# Patient Record
Sex: FEMALE | Race: WHITE | NOT HISPANIC OR LATINO | Employment: OTHER | URBAN - METROPOLITAN AREA
[De-identification: names, ages, dates, MRNs, and addresses within clinical notes are randomized per-mention and may not be internally consistent; named-entity substitution may affect disease eponyms.]

---

## 2023-12-10 ENCOUNTER — HOSPITAL ENCOUNTER (INPATIENT)
Facility: HOSPITAL | Age: 73
LOS: 3 days | Discharge: NURSING, REHAB OR LONG-TERM CARE | DRG: 554 | End: 2023-12-16
Attending: EMERGENCY MEDICINE | Admitting: INTERNAL MEDICINE
Payer: MEDICARE

## 2023-12-10 ENCOUNTER — APPOINTMENT (OUTPATIENT)
Dept: CT IMAGING | Facility: HOSPITAL | Age: 73
DRG: 554 | End: 2023-12-10
Payer: MEDICARE

## 2023-12-10 ENCOUNTER — APPOINTMENT (OUTPATIENT)
Dept: GENERAL RADIOLOGY | Facility: HOSPITAL | Age: 73
DRG: 554 | End: 2023-12-10
Payer: MEDICARE

## 2023-12-10 DIAGNOSIS — R44.3 HALLUCINATIONS: Primary | ICD-10-CM

## 2023-12-10 LAB
ALBUMIN SERPL-MCNC: 4.6 G/DL (ref 3.5–5.2)
ALBUMIN/GLOB SERPL: 2.4 G/DL
ALP SERPL-CCNC: 74 U/L (ref 39–117)
ALT SERPL W P-5'-P-CCNC: 14 U/L (ref 1–33)
AMPHET+METHAMPHET UR QL: NEGATIVE
ANION GAP SERPL CALCULATED.3IONS-SCNC: 10 MMOL/L (ref 5–15)
AST SERPL-CCNC: 18 U/L (ref 1–32)
BACTERIA UR QL AUTO: NORMAL /HPF
BARBITURATES UR QL SCN: NEGATIVE
BASOPHILS # BLD AUTO: 0.02 10*3/MM3 (ref 0–0.2)
BASOPHILS NFR BLD AUTO: 0.4 % (ref 0–1.5)
BENZODIAZ UR QL SCN: NEGATIVE
BILIRUB SERPL-MCNC: 0.8 MG/DL (ref 0–1.2)
BILIRUB UR QL STRIP: NEGATIVE
BUN SERPL-MCNC: 20 MG/DL (ref 8–23)
BUN/CREAT SERPL: 23 (ref 7–25)
CALCIUM SPEC-SCNC: 9.8 MG/DL (ref 8.6–10.5)
CANNABINOIDS SERPL QL: NEGATIVE
CHLORIDE SERPL-SCNC: 103 MMOL/L (ref 98–107)
CLARITY UR: CLEAR
CO2 SERPL-SCNC: 27 MMOL/L (ref 22–29)
COCAINE UR QL: NEGATIVE
COLOR UR: YELLOW
CREAT SERPL-MCNC: 0.87 MG/DL (ref 0.57–1)
DEPRECATED RDW RBC AUTO: 44.7 FL (ref 37–54)
EGFRCR SERPLBLD CKD-EPI 2021: 70.9 ML/MIN/1.73
EOSINOPHIL # BLD AUTO: 0.06 10*3/MM3 (ref 0–0.4)
EOSINOPHIL NFR BLD AUTO: 1.1 % (ref 0.3–6.2)
ERYTHROCYTE [DISTWIDTH] IN BLOOD BY AUTOMATED COUNT: 13.1 % (ref 12.3–15.4)
ETHANOL BLD-MCNC: <10 MG/DL (ref 0–10)
ETHANOL UR QL: <0.01 %
FENTANYL UR-MCNC: NEGATIVE NG/ML
GLOBULIN UR ELPH-MCNC: 1.9 GM/DL
GLUCOSE SERPL-MCNC: 108 MG/DL (ref 65–99)
GLUCOSE UR STRIP-MCNC: NEGATIVE MG/DL
HCT VFR BLD AUTO: 39.7 % (ref 34–46.6)
HGB BLD-MCNC: 13.1 G/DL (ref 12–15.9)
HGB UR QL STRIP.AUTO: NEGATIVE
HYALINE CASTS UR QL AUTO: NORMAL /LPF
IMM GRANULOCYTES # BLD AUTO: 0.02 10*3/MM3 (ref 0–0.05)
IMM GRANULOCYTES NFR BLD AUTO: 0.4 % (ref 0–0.5)
KETONES UR QL STRIP: NEGATIVE
LEUKOCYTE ESTERASE UR QL STRIP.AUTO: ABNORMAL
LYMPHOCYTES # BLD AUTO: 1.68 10*3/MM3 (ref 0.7–3.1)
LYMPHOCYTES NFR BLD AUTO: 29.5 % (ref 19.6–45.3)
MCH RBC QN AUTO: 31 PG (ref 26.6–33)
MCHC RBC AUTO-ENTMCNC: 33 G/DL (ref 31.5–35.7)
MCV RBC AUTO: 93.9 FL (ref 79–97)
METHADONE UR QL SCN: NEGATIVE
MONOCYTES # BLD AUTO: 0.66 10*3/MM3 (ref 0.1–0.9)
MONOCYTES NFR BLD AUTO: 11.6 % (ref 5–12)
NEUTROPHILS NFR BLD AUTO: 3.26 10*3/MM3 (ref 1.7–7)
NEUTROPHILS NFR BLD AUTO: 57 % (ref 42.7–76)
NITRITE UR QL STRIP: NEGATIVE
NRBC BLD AUTO-RTO: 0 /100 WBC (ref 0–0.2)
OPIATES UR QL: NEGATIVE
OXYCODONE UR QL SCN: NEGATIVE
PH UR STRIP.AUTO: 6.5 [PH] (ref 5–8)
PLATELET # BLD AUTO: 268 10*3/MM3 (ref 140–450)
PMV BLD AUTO: 10.6 FL (ref 6–12)
POTASSIUM SERPL-SCNC: 3.7 MMOL/L (ref 3.5–5.2)
PROT SERPL-MCNC: 6.5 G/DL (ref 6–8.5)
PROT UR QL STRIP: NEGATIVE
RBC # BLD AUTO: 4.23 10*6/MM3 (ref 3.77–5.28)
RBC # UR STRIP: NORMAL /HPF
REF LAB TEST METHOD: NORMAL
SODIUM SERPL-SCNC: 140 MMOL/L (ref 136–145)
SP GR UR STRIP: 1.01 (ref 1–1.03)
SQUAMOUS #/AREA URNS HPF: NORMAL /HPF
UROBILINOGEN UR QL STRIP: ABNORMAL
WBC # UR STRIP: NORMAL /HPF
WBC NRBC COR # BLD AUTO: 5.7 10*3/MM3 (ref 3.4–10.8)

## 2023-12-10 PROCEDURE — 71045 X-RAY EXAM CHEST 1 VIEW: CPT

## 2023-12-10 PROCEDURE — 80307 DRUG TEST PRSMV CHEM ANLYZR: CPT | Performed by: EMERGENCY MEDICINE

## 2023-12-10 PROCEDURE — G0378 HOSPITAL OBSERVATION PER HR: HCPCS

## 2023-12-10 PROCEDURE — 80053 COMPREHEN METABOLIC PANEL: CPT | Performed by: EMERGENCY MEDICINE

## 2023-12-10 PROCEDURE — 93010 ELECTROCARDIOGRAM REPORT: CPT | Performed by: INTERNAL MEDICINE

## 2023-12-10 PROCEDURE — 70450 CT HEAD/BRAIN W/O DYE: CPT

## 2023-12-10 PROCEDURE — 85025 COMPLETE CBC W/AUTO DIFF WBC: CPT | Performed by: EMERGENCY MEDICINE

## 2023-12-10 PROCEDURE — 93005 ELECTROCARDIOGRAM TRACING: CPT | Performed by: INTERNAL MEDICINE

## 2023-12-10 PROCEDURE — 99285 EMERGENCY DEPT VISIT HI MDM: CPT

## 2023-12-10 PROCEDURE — 82077 ASSAY SPEC XCP UR&BREATH IA: CPT | Performed by: EMERGENCY MEDICINE

## 2023-12-10 PROCEDURE — 81001 URINALYSIS AUTO W/SCOPE: CPT | Performed by: EMERGENCY MEDICINE

## 2023-12-10 RX ORDER — DONEPEZIL HYDROCHLORIDE 10 MG/1
5 TABLET, FILM COATED ORAL 2 TIMES DAILY
COMMUNITY

## 2023-12-10 RX ORDER — ONDANSETRON 2 MG/ML
4 INJECTION INTRAMUSCULAR; INTRAVENOUS EVERY 6 HOURS PRN
Status: DISCONTINUED | OUTPATIENT
Start: 2023-12-10 | End: 2023-12-16 | Stop reason: HOSPADM

## 2023-12-10 RX ORDER — ONDANSETRON 4 MG/1
4 TABLET, FILM COATED ORAL EVERY 6 HOURS PRN
Status: DISCONTINUED | OUTPATIENT
Start: 2023-12-10 | End: 2023-12-16 | Stop reason: HOSPADM

## 2023-12-10 RX ORDER — SODIUM CHLORIDE 0.9 % (FLUSH) 0.9 %
10 SYRINGE (ML) INJECTION AS NEEDED
Status: DISCONTINUED | OUTPATIENT
Start: 2023-12-10 | End: 2023-12-16 | Stop reason: HOSPADM

## 2023-12-10 RX ORDER — DONEPEZIL HYDROCHLORIDE 5 MG/1
5 TABLET, FILM COATED ORAL 2 TIMES DAILY
Status: DISCONTINUED | OUTPATIENT
Start: 2023-12-10 | End: 2023-12-16 | Stop reason: HOSPADM

## 2023-12-10 RX ORDER — POLYETHYLENE GLYCOL 3350 17 G/17G
17 POWDER, FOR SOLUTION ORAL DAILY PRN
Status: DISCONTINUED | OUTPATIENT
Start: 2023-12-10 | End: 2023-12-16 | Stop reason: HOSPADM

## 2023-12-10 RX ORDER — AMOXICILLIN 250 MG
2 CAPSULE ORAL 2 TIMES DAILY
Status: DISCONTINUED | OUTPATIENT
Start: 2023-12-10 | End: 2023-12-16 | Stop reason: HOSPADM

## 2023-12-10 RX ORDER — ROPINIROLE 1 MG/1
1 TABLET, FILM COATED ORAL NIGHTLY
COMMUNITY

## 2023-12-10 RX ORDER — ROPINIROLE 1 MG/1
1 TABLET, FILM COATED ORAL NIGHTLY
Status: DISCONTINUED | OUTPATIENT
Start: 2023-12-10 | End: 2023-12-16 | Stop reason: HOSPADM

## 2023-12-10 RX ORDER — SODIUM CHLORIDE 9 MG/ML
75 INJECTION, SOLUTION INTRAVENOUS CONTINUOUS
Status: DISCONTINUED | OUTPATIENT
Start: 2023-12-10 | End: 2023-12-11

## 2023-12-10 RX ORDER — BISACODYL 5 MG/1
5 TABLET, DELAYED RELEASE ORAL DAILY PRN
Status: DISCONTINUED | OUTPATIENT
Start: 2023-12-10 | End: 2023-12-16 | Stop reason: HOSPADM

## 2023-12-10 RX ORDER — MEMANTINE HYDROCHLORIDE 10 MG/1
10 TABLET ORAL EVERY 12 HOURS SCHEDULED
Status: DISCONTINUED | OUTPATIENT
Start: 2023-12-10 | End: 2023-12-16 | Stop reason: HOSPADM

## 2023-12-10 RX ORDER — MEMANTINE HYDROCHLORIDE 10 MG/1
10 TABLET ORAL 2 TIMES DAILY
COMMUNITY

## 2023-12-10 RX ORDER — MELOXICAM 15 MG/1
15 TABLET ORAL DAILY
COMMUNITY
End: 2023-12-16 | Stop reason: HOSPADM

## 2023-12-10 RX ORDER — LOPERAMIDE HYDROCHLORIDE 2 MG/1
2 CAPSULE ORAL 3 TIMES DAILY
COMMUNITY
End: 2023-12-16 | Stop reason: HOSPADM

## 2023-12-10 RX ORDER — ACETAMINOPHEN 325 MG/1
650 TABLET ORAL EVERY 4 HOURS PRN
Status: DISCONTINUED | OUTPATIENT
Start: 2023-12-10 | End: 2023-12-16 | Stop reason: HOSPADM

## 2023-12-10 RX ORDER — UREA 10 %
3 LOTION (ML) TOPICAL NIGHTLY PRN
Status: DISCONTINUED | OUTPATIENT
Start: 2023-12-10 | End: 2023-12-16 | Stop reason: HOSPADM

## 2023-12-10 RX ORDER — BISACODYL 10 MG
10 SUPPOSITORY, RECTAL RECTAL DAILY PRN
Status: DISCONTINUED | OUTPATIENT
Start: 2023-12-10 | End: 2023-12-16 | Stop reason: HOSPADM

## 2023-12-10 NOTE — ED NOTES
"Nursing report ED to floor  Tracy Leach  73 y.o.  female    HPI :   Chief Complaint   Patient presents with    Altered Mental Status    Back Pain       Admitting doctor:   Carly Pak MD    Admitting diagnosis:   The encounter diagnosis was Hallucinations.    Code status:   Current Code Status       Date Active Code Status Order ID Comments User Context       Not on file            Allergies:   Patient has no known allergies.    Isolation:   No active isolations    Intake and Output  No intake or output data in the 24 hours ending 12/10/23 1837    Weight:       12/10/23  1526   Weight: 89.8 kg (198 lb)       Most recent vitals:   Vitals:    12/10/23 1526 12/10/23 1712   BP: 154/98 (!) 171/108   Pulse: 78 83   Resp: 16    Temp: 97.7 °F (36.5 °C)    SpO2: 98% 97%   Weight: 89.8 kg (198 lb)    Height: 170.2 cm (67\")        Active LDAs/IV Access:   Lines, Drains & Airways       Active LDAs       Name Placement date Placement time Site Days    Peripheral IV 12/10/23 1551 Left Antecubital 12/10/23  1551  Antecubital  less than 1                    Labs (abnormal labs have a star):   Labs Reviewed   COMPREHENSIVE METABOLIC PANEL - Abnormal; Notable for the following components:       Result Value    Glucose 108 (*)     All other components within normal limits    Narrative:     GFR Normal >60  Chronic Kidney Disease <60  Kidney Failure <15    The GFR formula is only valid for adults with stable renal function between ages 18 and 70.   URINALYSIS W/ MICROSCOPIC IF INDICATED (NO CULTURE) - Abnormal; Notable for the following components:    Leuk Esterase, UA Trace (*)     All other components within normal limits   CBC WITH AUTO DIFFERENTIAL - Normal   URINE DRUG SCREEN - Normal    Narrative:     Negative Thresholds Per Drugs Screened:    Amphetamines                 500 ng/ml  Barbiturates                 200 ng/ml  Benzodiazepines              100 ng/ml  Cocaine                      300 ng/ml  Methadone      "               300 ng/ml  Opiates                      300 ng/ml  Oxycodone                    100 ng/ml  THC                           50 ng/ml  Fentanyl                       5 ng/ml      The Normal Value for all drugs tested is negative. This report includes final unconfirmed screening results to be used for medical treatment purposes only. Unconfirmed results must not be used for non-medical purposes such as employment or legal testing. Clinical consideration should be applied to any drug of abuse test, particularly when unconfirmed results are used.           ETHANOL   URINALYSIS, MICROSCOPIC ONLY   CBC AND DIFFERENTIAL    Narrative:     The following orders were created for panel order CBC & Differential.  Procedure                               Abnormality         Status                     ---------                               -----------         ------                     CBC Auto Differential[[id number]]        Normal              Final result                 Please view results for these tests on the individual orders.       EKG:   No orders to display       Meds given in ED:   Medications   sodium chloride 0.9 % flush 10 mL (has no administration in time range)       Imaging results:  CT Head Without Contrast    Result Date: 12/10/2023  Within the right frontal lobe white matter there is a subcentimeter focus of low density that may be related to chronic small vessel ischemic white matter change or represent a small area of encephalomalacia related to infarction that is likely chronic. There is no evidence for intracranial hemorrhage or convincing evidence for acute intracranial abnormality. Further evaluation could be performed with MRI if indicated.  This report was finalized on 12/10/2023 6:34 PM by Dr. Polo Mccullough M.D on Workstation: [id number]      XR Chest 1 View    Result Date: 12/10/2023  1. Mild cardiomegaly.   This report was finalized on 12/10/2023 4:20 PM by Dr. Efrain Yu M.D on  Workstation: [id number]       Ambulatory status:   - assist x2    Social issues:   Social History     Socioeconomic History    Marital status:        NIH Stroke Scale:       Ramila Odonnell RN  12/10/23 18:37 EST

## 2023-12-10 NOTE — ED PROVIDER NOTES
EMERGENCY DEPARTMENT ENCOUNTER    Room Number:  20/20  PCP: France Tomlin APRN  Patient Care Team:  France Tomlin APRN as PCP - General (Family Medicine)   Independent Historians: Patient, family, EMS    HPI:  Chief Complaint: Altered mental status.    A complete HPI/ROS/PMH/PSH/SH/FH are unobtainable due to: Altered mental status    Chronic or social conditions impacting patient care (Social Determinants of Health): Nothing  (Financial Resource Strain / Food Insecurity / Transportation Needs / Physical Activity / Stress / Social Connections / Intimate Partner Violence / Housing Stability)    Context: Tracy Leach is a 73 y.o. female who presents to the ED c/o acute altered mental status.  Family reports that 3 weeks ago the patient started having pain in her bilateral hips and saw orthopedics and was told that she needed hip replacements.  1 week ago she started having altered mental status.  She started having visual hallucinations.  They took her to the doctor and a urine test was ordered and they started her on Cipro for UTI.  The doctor called back and told her that the urine was contaminated.  The patient had 2 days of antibiotics.  Family reports she has had worsened mental status changes since yesterday.  She has not had any falls or injury.  She does have a history of Lewy body dementia.  Family states she has never behaved like this in the past.    Review of prior external notes (non-ED) -and- Review of prior external test results outside of this encounter: Urine culture dated 12/8/2023 was positive for  mixed shoaib    Prescription drug monitoring program review:         PAST MEDICAL HISTORY  Active Ambulatory Problems     Diagnosis Date Noted    No Active Ambulatory Problems     Resolved Ambulatory Problems     Diagnosis Date Noted    No Resolved Ambulatory Problems     No Additional Past Medical History         PAST SURGICAL HISTORY  No past surgical history on file.      FAMILY HISTORY  No  family history on file.      SOCIAL HISTORY  Social History     Socioeconomic History    Marital status:          ALLERGIES  Patient has no known allergies.        REVIEW OF SYSTEMS  Review of Systems  Included in HPI  All systems reviewed and negative except for those discussed in HPI.      PHYSICAL EXAM    I have reviewed the triage vital signs and nursing notes.    ED Triage Vitals [12/10/23 1526]   Temp Heart Rate Resp BP SpO2   97.7 °F (36.5 °C) 78 16 154/98 98 %      Temp src Heart Rate Source Patient Position BP Location FiO2 (%)   -- -- -- -- --       Physical Exam  GENERAL: Awake, alert, no acute distress  SKIN: Warm, dry  HENT: Normocephalic, atraumatic  EYES: no scleral icterus  CV: regular rhythm, regular rate  RESPIRATORY: normal effort, lungs clear  ABDOMEN: soft, nontender, nondistended  MUSCULOSKELETAL: no deformity.  Tenderness in the bilateral anterior hips.  Equal pedal pulses.  Normal lower extremity sensation and motor.  NEURO: alert, moves all extremities, follows commands                                                               LAB RESULTS  Recent Results (from the past 24 hour(s))   Comprehensive Metabolic Panel    Collection Time: 12/10/23  3:50 PM    Specimen: Blood   Result Value Ref Range    Glucose 108 (H) 65 - 99 mg/dL    BUN 20 8 - 23 mg/dL    Creatinine 0.87 0.57 - 1.00 mg/dL    Sodium 140 136 - 145 mmol/L    Potassium 3.7 3.5 - 5.2 mmol/L    Chloride 103 98 - 107 mmol/L    CO2 27.0 22.0 - 29.0 mmol/L    Calcium 9.8 8.6 - 10.5 mg/dL    Total Protein 6.5 6.0 - 8.5 g/dL    Albumin 4.6 3.5 - 5.2 g/dL    ALT (SGPT) 14 1 - 33 U/L    AST (SGOT) 18 1 - 32 U/L    Alkaline Phosphatase 74 39 - 117 U/L    Total Bilirubin 0.8 0.0 - 1.2 mg/dL    Globulin 1.9 gm/dL    A/G Ratio 2.4 g/dL    BUN/Creatinine Ratio 23.0 7.0 - 25.0    Anion Gap 10.0 5.0 - 15.0 mmol/L    eGFR 70.9 >60.0 mL/min/1.73   CBC Auto Differential    Collection Time: 12/10/23  3:50 PM    Specimen: Blood   Result Value  Ref Range    WBC 5.70 3.40 - 10.80 10*3/mm3    RBC 4.23 3.77 - 5.28 10*6/mm3    Hemoglobin 13.1 12.0 - 15.9 g/dL    Hematocrit 39.7 34.0 - 46.6 %    MCV 93.9 79.0 - 97.0 fL    MCH 31.0 26.6 - 33.0 pg    MCHC 33.0 31.5 - 35.7 g/dL    RDW 13.1 12.3 - 15.4 %    RDW-SD 44.7 37.0 - 54.0 fl    MPV 10.6 6.0 - 12.0 fL    Platelets 268 140 - 450 10*3/mm3    Neutrophil % 57.0 42.7 - 76.0 %    Lymphocyte % 29.5 19.6 - 45.3 %    Monocyte % 11.6 5.0 - 12.0 %    Eosinophil % 1.1 0.3 - 6.2 %    Basophil % 0.4 0.0 - 1.5 %    Immature Grans % 0.4 0.0 - 0.5 %    Neutrophils, Absolute 3.26 1.70 - 7.00 10*3/mm3    Lymphocytes, Absolute 1.68 0.70 - 3.10 10*3/mm3    Monocytes, Absolute 0.66 0.10 - 0.90 10*3/mm3    Eosinophils, Absolute 0.06 0.00 - 0.40 10*3/mm3    Basophils, Absolute 0.02 0.00 - 0.20 10*3/mm3    Immature Grans, Absolute 0.02 0.00 - 0.05 10*3/mm3    nRBC 0.0 0.0 - 0.2 /100 WBC   Ethanol    Collection Time: 12/10/23  3:50 PM    Specimen: Blood   Result Value Ref Range    Ethanol <10 0 - 10 mg/dL    Ethanol % <0.010 %   Urinalysis With Microscopic If Indicated (No Culture) - Urine, Clean Catch    Collection Time: 12/10/23  4:41 PM    Specimen: Urine, Clean Catch   Result Value Ref Range    Color, UA Yellow Yellow, Straw    Appearance, UA Clear Clear    pH, UA 6.5 5.0 - 8.0    Specific Gravity, UA 1.011 1.005 - 1.030    Glucose, UA Negative Negative    Ketones, UA Negative Negative    Bilirubin, UA Negative Negative    Blood, UA Negative Negative    Protein, UA Negative Negative    Leuk Esterase, UA Trace (A) Negative    Nitrite, UA Negative Negative    Urobilinogen, UA 1.0 E.U./dL 0.2 - 1.0 E.U./dL   Urine Drug Screen - Urine, Clean Catch    Collection Time: 12/10/23  4:41 PM    Specimen: Urine, Clean Catch   Result Value Ref Range    Amphet/Methamphet, Screen Negative Negative    Barbiturates Screen, Urine Negative Negative    Benzodiazepine Screen, Urine Negative Negative    Cocaine Screen, Urine Negative Negative     Opiate Screen Negative Negative    THC, Screen, Urine Negative Negative    Methadone Screen, Urine Negative Negative    Oxycodone Screen, Urine Negative Negative    Fentanyl, Urine Negative Negative   Urinalysis, Microscopic Only - Urine, Clean Catch    Collection Time: 12/10/23  4:41 PM    Specimen: Urine, Clean Catch   Result Value Ref Range    RBC, UA 0-2 None Seen, 0-2 /HPF    WBC, UA 0-2 None Seen, 0-2 /HPF    Bacteria, UA None Seen None Seen /HPF    Squamous Epithelial Cells, UA 0-2 None Seen, 0-2 /HPF    Hyaline Casts, UA 0-2 None Seen /LPF    Methodology Automated Microscopy        I ordered the above labs and independently reviewed the results.        RADIOLOGY  CT Head Without Contrast    Result Date: 12/10/2023  CT HEAD WITHOUT CONTRAST  HISTORY: Altered mental status. Visual hallucinations.  TECHNIQUE:  Head CT includes axial imaging from the base of skull to the vertex without intravenous contrast. Radiation dose reduction techniques were utilized, including automated exposure control and exposure modulation based on body size.  COMPARISON:None  FINDINGS: Within the right frontal lobe, anterior corona radiata there is a small, rounded 7 mm focus of low density that could be related to small vessel white matter disease or a small infarction, likely chronic though there are no previous studies for comparison. There are no abnormal areas of increased attenuation intra-axially to suggest hemorrhage. No extra-axial fluid collection is observed. There is no evidence for cerebral edema or mass effect or shift of midline structures. Ventricles appear normal in size and configuration. Mastoid air cells and the visualized paranasal sinuses appear clear.      Within the right frontal lobe white matter there is a subcentimeter focus of low density that may be related to chronic small vessel ischemic white matter change or represent a small area of encephalomalacia related to infarction that is likely chronic. There  is no evidence for intracranial hemorrhage or convincing evidence for acute intracranial abnormality. Further evaluation could be performed with MRI if indicated.  This report was finalized on 12/10/2023 6:34 PM by Dr. Polo Mccullough M.D on Workstation: [id number]      XR Chest 1 View    Result Date: 12/10/2023  XR CHEST 1 VW-12/10/2023  HISTORY: Altered mental status.  Heart size is mildly enlarged. Lungs appear free of acute infiltrates. Bones and soft tissues are otherwise unremarkable.      1. Mild cardiomegaly.   This report was finalized on 12/10/2023 4:20 PM by Dr. Efrain Yu M.D on Workstation: [id number]       I ordered the above noted radiological studies. Reviewed by me and discussed with radiologist.  See dictation for official radiology interpretation.      PROCEDURES    Procedures      MEDICATIONS GIVEN IN ER    Medications   sodium chloride 0.9 % flush 10 mL (has no administration in time range)         ORDERS PLACED DURING THIS VISIT:  Orders Placed This Encounter   Procedures    CT Head Without Contrast    XR Chest 1 View    Comprehensive Metabolic Panel    Urinalysis With Microscopic If Indicated (No Culture) - Urine, Clean Catch    CBC Auto Differential    Urine Drug Screen - Urine, Clean Catch    Ethanol    Urinalysis, Microscopic Only - Urine, Clean Catch    Monitor Blood Pressure    Pulse Oximetry, Continuous    Insert Peripheral IV    Initiate Observation Status    CBC & Differential         PROGRESS, DATA ANALYSIS, CONSULTS, AND MEDICAL DECISION MAKING    All labs have been independently interpreted by me.  All radiology studies have been reviewed by me and discussed with radiologist dictating the report.   EKG's independently viewed and interpreted by me.  Discussion below represents my analysis of pertinent findings related to patient's condition, differential diagnosis, treatment plan and final disposition.    Differential diagnosis includes but is not limited to UTI, pneumonia,  altered mental status, encephalopathy, intracranial hemorrhage, dementia.    Clinical Scores:              ED Course as of 12/10/23 1906   Sun Dec 10, 2023   1628 XR Chest 1 View  My independent interpretation of the chest x-ray is no dense consolidation [TR]   1709 Urinalysis shows no evidence of infection [TR]   1830 I reviewed the workup and findings with the patient and family at the bedside.  Answered all questions.  Her laboratory evaluation is unremarkable.  Her CT of the head shows a potential chronic area of encephalomalacia.  She has been having persistent hallucinations.  Plan admission to the hospital for further evaluation.  The patient and family are agreeable.  As far as new medications go she has only been on Cipro.  She has been on a pain medicine for a month for her hips but nothing new in the last week.  They have changed her donepezil to twice daily but the same total 24-hour dose. [TR]   1833 Discussing with Dr. Pak with [facility].  She agrees to admit. [TR]   1836 Family does report that she has had some slurred speech for the last few days. [TR]      ED Course User Index  [TR] Anrdew Miller MD                     AS OF 19:06 EST VITALS:    BP - (!) 174/122  HR - 75  TEMP - 97.7 °F (36.5 °C)  O2 SATS - 96%        DIAGNOSIS  Final diagnoses:   Hallucinations         DISPOSITION  ED Disposition       ED Disposition   Decision to Admit    Condition   --    Comment   Level of Care: Telemetry [5]   Diagnosis: Hallucinations [780.1.ICD-9-CM]   Admitting Physician: CARMEN PAK [[id number]]   Attending Physician: CARMEN PAK [[id number]]                    Note Disclaimer: At [facility], we believe that sharing information builds trust and better relationships. You are receiving this note because you recently visited [facility]. It is possible you will see health information before a provider has talked with you about it. This kind of information can be easy to misunderstand. To help  you fully understand what it means for your health, we urge you to discuss this note with your provider.         Andrew Miller MD  12/10/23 9044

## 2023-12-10 NOTE — ED TRIAGE NOTES
Patient from home via EMS.Family reported to EMS that patient has not been acting herself for several days. She was seen by PCP last week to check for a UTI, family received a call that the urine sample was contaminated. Patient is alert and oriented x 4. Patient reporting left lower back pain that radiates into left anterior thigh, describes pain as cramping. Patient denies any urinary complaints. Patient states she has Lewy Body Disease.

## 2023-12-11 ENCOUNTER — APPOINTMENT (OUTPATIENT)
Dept: GENERAL RADIOLOGY | Facility: HOSPITAL | Age: 73
DRG: 554 | End: 2023-12-11
Payer: MEDICARE

## 2023-12-11 ENCOUNTER — APPOINTMENT (OUTPATIENT)
Dept: CARDIOLOGY | Facility: HOSPITAL | Age: 73
DRG: 554 | End: 2023-12-11
Payer: MEDICARE

## 2023-12-11 PROBLEM — I10 HTN (HYPERTENSION): Status: ACTIVE | Noted: 2023-12-11

## 2023-12-11 PROBLEM — M25.551 HIP PAIN, BILATERAL: Status: ACTIVE | Noted: 2023-12-11

## 2023-12-11 PROBLEM — G31.83 LEWY BODY DEMENTIA: Status: ACTIVE | Noted: 2023-12-11

## 2023-12-11 PROBLEM — F02.80 LEWY BODY DEMENTIA: Status: ACTIVE | Noted: 2023-12-11

## 2023-12-11 PROBLEM — M25.552 HIP PAIN, BILATERAL: Status: ACTIVE | Noted: 2023-12-11

## 2023-12-11 PROBLEM — I48.91 ATRIAL FIBRILLATION: Status: ACTIVE | Noted: 2023-12-11

## 2023-12-11 PROBLEM — M25.562 KNEE PAIN, LEFT: Status: ACTIVE | Noted: 2023-12-11

## 2023-12-11 PROBLEM — R03.0 ELEVATED BP WITHOUT DIAGNOSIS OF HYPERTENSION: Status: ACTIVE | Noted: 2023-12-11

## 2023-12-11 LAB
ANION GAP SERPL CALCULATED.3IONS-SCNC: 11.8 MMOL/L (ref 5–15)
BUN SERPL-MCNC: 20 MG/DL (ref 8–23)
BUN/CREAT SERPL: 30.8 (ref 7–25)
CALCIUM SPEC-SCNC: 9.1 MG/DL (ref 8.6–10.5)
CHLORIDE SERPL-SCNC: 104 MMOL/L (ref 98–107)
CO2 SERPL-SCNC: 23.2 MMOL/L (ref 22–29)
CREAT SERPL-MCNC: 0.65 MG/DL (ref 0.57–1)
DEPRECATED RDW RBC AUTO: 43.1 FL (ref 37–54)
EGFRCR SERPLBLD CKD-EPI 2021: 93.7 ML/MIN/1.73
ERYTHROCYTE [DISTWIDTH] IN BLOOD BY AUTOMATED COUNT: 12.7 % (ref 12.3–15.4)
GLUCOSE SERPL-MCNC: 91 MG/DL (ref 65–99)
HCT VFR BLD AUTO: 37.9 % (ref 34–46.6)
HGB BLD-MCNC: 12.5 G/DL (ref 12–15.9)
MCH RBC QN AUTO: 30.5 PG (ref 26.6–33)
MCHC RBC AUTO-ENTMCNC: 33 G/DL (ref 31.5–35.7)
MCV RBC AUTO: 92.4 FL (ref 79–97)
PLATELET # BLD AUTO: 237 10*3/MM3 (ref 140–450)
PMV BLD AUTO: 10.4 FL (ref 6–12)
POTASSIUM SERPL-SCNC: 3.5 MMOL/L (ref 3.5–5.2)
QT INTERVAL: 373 MS
QTC INTERVAL: 431 MS
RBC # BLD AUTO: 4.1 10*6/MM3 (ref 3.77–5.28)
SODIUM SERPL-SCNC: 139 MMOL/L (ref 136–145)
TSH SERPL DL<=0.05 MIU/L-ACNC: 0.88 UIU/ML (ref 0.27–4.2)
WBC NRBC COR # BLD AUTO: 5.92 10*3/MM3 (ref 3.4–10.8)

## 2023-12-11 PROCEDURE — 80048 BASIC METABOLIC PNL TOTAL CA: CPT | Performed by: INTERNAL MEDICINE

## 2023-12-11 PROCEDURE — 73564 X-RAY EXAM KNEE 4 OR MORE: CPT

## 2023-12-11 PROCEDURE — 25810000003 SODIUM CHLORIDE 0.9 % SOLUTION: Performed by: INTERNAL MEDICINE

## 2023-12-11 PROCEDURE — G0378 HOSPITAL OBSERVATION PER HR: HCPCS

## 2023-12-11 PROCEDURE — 73521 X-RAY EXAM HIPS BI 2 VIEWS: CPT

## 2023-12-11 PROCEDURE — 36415 COLL VENOUS BLD VENIPUNCTURE: CPT | Performed by: INTERNAL MEDICINE

## 2023-12-11 PROCEDURE — 93880 EXTRACRANIAL BILAT STUDY: CPT

## 2023-12-11 PROCEDURE — 99222 1ST HOSP IP/OBS MODERATE 55: CPT | Performed by: INTERNAL MEDICINE

## 2023-12-11 PROCEDURE — 99204 OFFICE O/P NEW MOD 45 MIN: CPT | Performed by: NURSE PRACTITIONER

## 2023-12-11 PROCEDURE — 85027 COMPLETE CBC AUTOMATED: CPT | Performed by: INTERNAL MEDICINE

## 2023-12-11 PROCEDURE — 84443 ASSAY THYROID STIM HORMONE: CPT | Performed by: PSYCHIATRY & NEUROLOGY

## 2023-12-11 PROCEDURE — 25010000002 ENOXAPARIN PER 10 MG: Performed by: INTERNAL MEDICINE

## 2023-12-11 RX ORDER — ENOXAPARIN SODIUM 100 MG/ML
1 INJECTION SUBCUTANEOUS EVERY 12 HOURS
Status: DISCONTINUED | OUTPATIENT
Start: 2023-12-11 | End: 2023-12-13

## 2023-12-11 RX ORDER — HYDRALAZINE HYDROCHLORIDE 20 MG/ML
10 INJECTION INTRAMUSCULAR; INTRAVENOUS EVERY 6 HOURS PRN
Status: DISCONTINUED | OUTPATIENT
Start: 2023-12-11 | End: 2023-12-16 | Stop reason: HOSPADM

## 2023-12-11 RX ADMIN — DONEPEZIL HYDROCHLORIDE 5 MG: 5 TABLET, FILM COATED ORAL at 20:45

## 2023-12-11 RX ADMIN — MEMANTINE HYDROCHLORIDE 10 MG: 10 TABLET, FILM COATED ORAL at 08:37

## 2023-12-11 RX ADMIN — MEMANTINE HYDROCHLORIDE 10 MG: 10 TABLET, FILM COATED ORAL at 20:45

## 2023-12-11 RX ADMIN — MEMANTINE HYDROCHLORIDE 10 MG: 10 TABLET, FILM COATED ORAL at 00:18

## 2023-12-11 RX ADMIN — SODIUM CHLORIDE 75 ML/HR: 9 INJECTION, SOLUTION INTRAVENOUS at 00:25

## 2023-12-11 RX ADMIN — METOPROLOL TARTRATE 25 MG: 25 TABLET, FILM COATED ORAL at 14:29

## 2023-12-11 RX ADMIN — DONEPEZIL HYDROCHLORIDE 5 MG: 5 TABLET, FILM COATED ORAL at 08:37

## 2023-12-11 RX ADMIN — ENOXAPARIN SODIUM 90 MG: 100 INJECTION SUBCUTANEOUS at 20:45

## 2023-12-11 RX ADMIN — DONEPEZIL HYDROCHLORIDE 5 MG: 5 TABLET, FILM COATED ORAL at 00:18

## 2023-12-11 RX ADMIN — ROPINIROLE 1 MG: 1 TABLET, FILM COATED ORAL at 00:18

## 2023-12-11 RX ADMIN — METOPROLOL TARTRATE 25 MG: 25 TABLET, FILM COATED ORAL at 20:45

## 2023-12-11 RX ADMIN — ACETAMINOPHEN 650 MG: 325 TABLET, FILM COATED ORAL at 08:52

## 2023-12-11 RX ADMIN — ROPINIROLE 1 MG: 1 TABLET, FILM COATED ORAL at 20:45

## 2023-12-11 RX ADMIN — ACETAMINOPHEN 650 MG: 325 TABLET, FILM COATED ORAL at 20:46

## 2023-12-11 NOTE — CONSULTS
Date of Hospital Visit: 2023  Encounter Provider: Froy Dave MD  Place of Service: [facility] CARDIOLOGY  Patient Name: Tracy Leach  :1950  Referral Provider: Carly Pak, *    Chief complaint: altered mental status.     History of Present Illness    Tracy Leach is a 73 year-old female with a past medica history significant for Lewy body dementia.  Her son is at bedside who provides additional historical details.    She presented to the emergency department on 12/10/2023 with complaints of acute altered mental status. Family reported that one week ago, her mental status seemed altered and she starting having visual hallucinations. Her family reported that after initiation of medical therapy for dementia she had not experienced anything like this over the last few months until the last 2 weeks. Upon arrival to the emergency department, she was found to be in rate controlled atrial fibrillation. In 2022, she had an EKG done at [facility] that showed atrial fibrillation with a rate of 123. She is not anticoagulated. Her blood work was all essentially normal.  Her chest xray showed mild cardiomegaly.     HPI was reviewed, updated and amended when necessary.    Past Medical History:   Diagnosis Date    Cataract     Dementia     Restless leg     2nd to dementia       Past Surgical History:   Procedure Laterality Date    HYSTERECTOMY      JOINT REPLACEMENT      REPLACEMENT TOTAL KNEE BILATERAL      2016 JULY RIGHT 2017 APIL LEFT       Medications Prior to Admission   Medication Sig Dispense Refill Last Dose    donepezil (ARICEPT) 10 MG tablet Take 0.5 tablets by mouth 2 (Two) Times a Day.   12/10/2023 at 0800    loperamide (IMODIUM) 2 MG capsule Take 1 capsule by mouth 3 times a day.   12/10/2023 at 0800    memantine (NAMENDA) 10 MG tablet Take 1 tablet by mouth 2 (Two) Times a Day.   12/10/2023 at 0800    rOPINIRole (REQUIP) 1 MG tablet Take 1  tablet by mouth Every Night. Take 1 hour before bedtime.   2023    meloxicam (MOBIC) 15 MG tablet Take 1 tablet by mouth Daily. With food          Current Meds  Scheduled Meds:donepezil, 5 mg, Oral, BID  memantine, 10 mg, Oral, Q12H  rOPINIRole, 1 mg, Oral, Nightly  senna-docusate sodium, 2 tablet, Oral, BID      Continuous Infusions:sodium chloride, 75 mL/hr, Last Rate: 75 mL/hr (23 0025)      PRN Meds:.  acetaminophen    senna-docusate sodium **AND** polyethylene glycol **AND** bisacodyl **AND** bisacodyl    melatonin    ondansetron **OR** ondansetron    [COMPLETED] Insert Peripheral IV **AND** sodium chloride    Allergies as of 12/10/2023    (No Known Allergies)       Social History     Socioeconomic History    Marital status:    Tobacco Use    Smoking status: Former     Types: Cigarettes     Quit date: 1989     Years since quittin.0    Smokeless tobacco: Never   Substance and Sexual Activity    Alcohol use: Never    Drug use: Never    Sexual activity: Defer     HPI was reviewed, updated and amended when necessary.    Review of Systems   Constitutional: Negative for chills and fever.   HENT:  Negative for hoarse voice and sore throat.    Eyes:  Negative for double vision and photophobia.   Cardiovascular:  Negative for chest pain, leg swelling, near-syncope, orthopnea, palpitations, paroxysmal nocturnal dyspnea and syncope.   Respiratory:  Negative for cough and wheezing.    Skin:  Negative for poor wound healing and rash.   Musculoskeletal:  Negative for arthritis and joint swelling.   Gastrointestinal:  Negative for bloating, abdominal pain, hematemesis and hematochezia.   Neurological:  Negative for dizziness and focal weakness.   Psychiatric/Behavioral:  Negative for depression and suicidal ideas.             Objective:   Temp:  [97.5 °F (36.4 °C)-97.9 °F (36.6 °C)] 97.5 °F (36.4 °C)  Heart Rate:  [75-83] 82  Resp:  [16] 16  BP: (131-174)/() 155/93  Body mass index is 31.16  "kg/m².  Flowsheet Rows      Flowsheet Row First Filed Value   Admission Height 170.2 cm (67\") Documented at 12/10/2023 1526   Admission Weight 89.8 kg (198 lb) Documented at 12/10/2023 1526          Vitals:    12/11/23 0025   BP: 155/93   Pulse:    Resp: 16   Temp: 97.5 °F (36.4 °C)   SpO2:        Vitals reviewed.   Constitutional:       Appearance: Healthy appearance. Not in distress.   Neck:      Vascular: No JVR. JVD normal.   Pulmonary:      Effort: Pulmonary effort is normal.      Breath sounds: Normal breath sounds. No wheezing. No rhonchi. No rales.   Chest:      Chest wall: Not tender to palpatation.   Cardiovascular:      PMI at left midclavicular line. Normal rate. Irregularly irregular rhythm. Normal S1. Normal S2.       Murmurs: There is no murmur.      No gallop.  No click. No rub.   Pulses:     Intact distal pulses.   Edema:     Peripheral edema absent.   Abdominal:      General: Bowel sounds are normal.      Palpations: Abdomen is soft.      Tenderness: There is no abdominal tenderness.   Musculoskeletal: Normal range of motion.         General: No tenderness. Skin:     General: Skin is warm and dry.   Neurological:      General: No focal deficit present.                 Lab Review:      Results from last 7 days   Lab Units 12/10/23  1550   SODIUM mmol/L 140   POTASSIUM mmol/L 3.7   CHLORIDE mmol/L 103   CO2 mmol/L 27.0   BUN mg/dL 20   CREATININE mg/dL 0.87   CALCIUM mg/dL 9.8   BILIRUBIN mg/dL 0.8   ALK PHOS U/L 74   ALT (SGPT) U/L 14   AST (SGOT) U/L 18   GLUCOSE mg/dL 108*         @LABRCNTbnp@  Results from last 7 days   Lab Units 12/10/23  1550   WBC 10*3/mm3 5.70   HEMOGLOBIN g/dL 13.1   HEMATOCRIT % 39.7   PLATELETS 10*3/mm3 268             @LABRCNTIP(chol,trig,hdl,ldl)    12/10/23      I personally viewed and interpreted the patient's EKG/Telemetry data    Hallucinations    Assessment and Plan:    Persistent atrial fibrillation -no previous cardiac diagnoses.  Her son feels that she does snore " often but she has never been tested for sleep apnea.  She has experienced 2 falls this year.  Her NEF2HR5-OBBs score is 2 because of gender and age.  I had a long discussion with son and patient at bedside and while she is in the hospital we will keep her on anticoagulation however they will need to make a decision at time of discharge regarding the risks and benefits of stroke prevention versus bleeding risk.  They are leaning towards anticoagulation because she also has need for orthopedic surgery soon.  Lewy body dementia    Froy Dave MD  12/11/23  06:57 EST.  Time spent in reviewing chart, discussion and examination:

## 2023-12-11 NOTE — PLAN OF CARE
Goal Outcome Evaluation:                    Pt is A&Ox4, pt up with assistance to bathroom/bsc, pt had XR of hips, pelvis and knee, pending results, vital signs monitored, will continue to monitor rest of shift.

## 2023-12-11 NOTE — PROGRESS NOTES
Name: Tracy Leach ADMIT: 12/10/2023   : 1950  PCP: France Tomlin APRN    MRN: [id number] LOS: 0 days   AGE/SEX: 73 y.o. female  ROOM: [facility]     Subjective   Subjective   Resting in bed.  States that her hips and knees are killing her even just moving in the bed.  Her son is at bedside.  She denies any chest pain, dyspnea, cough, fever or chills.  Denies any nausea, vomiting or abdominal pain.  Son reports that she has had increased hallucinations at home for the last several days.     Objective   Objective   Vital Signs  Temp:  [97.5 °F (36.4 °C)-98.8 °F (37.1 °C)] 98.8 °F (37.1 °C)  Heart Rate:  [75-84] 84  Resp:  [16-18] 18  BP: (128-174)/() 128/106  SpO2:  [93 %-98 %] 95 %  on   ;   Device (Oxygen Therapy): room air  Body mass index is 31.16 kg/m².    Physical Exam  Vitals and nursing note reviewed.   Constitutional:       Appearance: She is obese. She is ill-appearing. She is not toxic-appearing.   Cardiovascular:      Rate and Rhythm: Normal rate. Rhythm irregular.      Pulses: Normal pulses.   Pulmonary:      Effort: Pulmonary effort is normal. No respiratory distress.      Breath sounds: Normal breath sounds.   Abdominal:      General: Bowel sounds are normal. There is no distension.      Palpations: Abdomen is soft.      Tenderness: There is no abdominal tenderness.   Musculoskeletal:         General: Tenderness (left knee) present. No swelling. Normal range of motion.      Cervical back: Normal range of motion and neck supple.   Skin:     General: Skin is warm and dry.      Findings: No bruising.   Neurological:      Mental Status: She is alert and oriented to person, place, and time.      Sensory: No sensory deficit.      Motor: Weakness present.      Coordination: Coordination normal.   Psychiatric:         Mood and Affect: Mood normal.         Behavior: Behavior normal.     Results Review:       I reviewed the patient's new clinical results.  Results from last 7 days   Lab  "Units 12/11/23  0629 12/10/23  1550   WBC 10*3/mm3 5.92 5.70   HEMOGLOBIN g/dL 12.5 13.1   PLATELETS 10*3/mm3 237 268     Results from last 7 days   Lab Units 12/11/23  0629 12/10/23  1550   SODIUM mmol/L 139 140   POTASSIUM mmol/L 3.5 3.7   CHLORIDE mmol/L 104 103   CO2 mmol/L 23.2 27.0   BUN mg/dL 20 20   CREATININE mg/dL 0.65 0.87   GLUCOSE mg/dL 91 108*   Estimated Creatinine Clearance: 90.2 mL/min (by C-G formula based on SCr of 0.65 mg/dL).  Results from last 7 days   Lab Units 12/10/23  1550   ALBUMIN g/dL 4.6   BILIRUBIN mg/dL 0.8   ALK PHOS U/L 74   AST (SGOT) U/L 18   ALT (SGPT) U/L 14     Results from last 7 days   Lab Units 12/11/23  0629 12/10/23  1550   CALCIUM mg/dL 9.1 9.8   ALBUMIN g/dL  --  4.6       No results found for: \"HGBA1C\", \"POCGLU\"    donepezil, 5 mg, Oral, BID  enoxaparin, 1 mg/kg, Subcutaneous, Q12H  memantine, 10 mg, Oral, Q12H  metoprolol tartrate, 25 mg, Oral, Q12H  rOPINIRole, 1 mg, Oral, Nightly  senna-docusate sodium, 2 tablet, Oral, BID      sodium chloride, 75 mL/hr, Last Rate: 75 mL/hr (12/11/23 0025)    Diet: Cardiac Diets; Healthy Heart (2-3 Na+); Texture: Regular Texture (IDDSI 7); Fluid Consistency: Thin (IDDSI 0)       Assessment/Plan     Active Hospital Problems    Diagnosis  POA    **Hallucinations [R44.3]  Yes    Hip pain, bilateral [M25.551, M25.552]  Yes    Knee pain, left [M25.562]  Yes    Lewy body dementia [G31.83, F02.80]  Yes    HTN (hypertension) [I10]  Yes    Atrial fibrillation [I48.91]  Yes      Resolved Hospital Problems   No resolved problems to display.     Ms. Leach is a 73 y.o. female who presented to the hospital with complaints of hallucinations.  She has a known history of Lewy body dementia with prior hallucinations that have been well-controlled with medications.  She was recently complaining of some frequency of urination and felt to have a UTI.  She was given Cipro, but had worsening confusion with hallucinations.  She was evaluated by her " orthopedic surgeon a few weeks ago and told that she needs bilateral hip replacements and plan to have one completed in March.  Despite her Lewy body dementia, she apparently lives independently with close family assistance.  She did apparently have a fall in the last couple weeks when she was found on the floor by family members.    Hallucinations  Lewy body dementia  -Neurology has been consulted.  -CT head with area of low density in the right frontal lobe possibly related to chronic small vessel ischemic disease versus small area of encephalomalacia related to an infarction that is slightly chronic.  -She apparently would need premedication to tolerate an MRI which would likely trigger issues with her Lewy body dementia.  For this reason, neurology does not feel MRI would  at this time and have deferred this.  -Monitor mental status.  -Neurology feels hallucinations/confusion secondary to Cipro use.     Atrial fibrillation  -New diagnosis without prior cardiac history.  -Cardiology has been consulted.  -Plans to start anticoagulation while in the hospital-however family to decide whether or not to keep anticoagulation at discharge given her history of dementia and falls.    Hypertension  -Not appear to be on any medication this.  -Having some variable BP readings. HR controlled. Cards added BB.    Bilateral hip pain  Bilateral knee pain  -With recent fall, will obtain basic x-rays of her hips and knees.  -He is already established with Dr. Neumann who is going to replace her left hip in March.  Will need eventual bilateral replacements.  -History of knee replacements-will just need to ensure no issues with the hardware or new fractures.    Discussed with patient as well as her son at bedside.    VTE Prophylaxis - Pharmacy to dose Lovenox  Code Status - Full code  Disposition - Anticipate discharge TBD. Ask PT to see.      ASIF Wong  [facility]  12/11/23  13:29  EST

## 2023-12-11 NOTE — H&P
HISTORY AND PHYSICAL   [facility]        Date of Admission: 12/10/2023  Patient Identification:  Name: Tracy Leach  Age: 73 y.o.  Sex: female  :  1950  MRN: [id number]                     Primary Care Physician: France Tomlin APRN    Chief Complaint:  73 year old female presented to the emergency room with altered mental status; she has a history of lewy body dementia but usually functions well according to family; she has had hallucinations and has been more confused; she has had hip pain and was told that she needed a hip replacement; she saw her pcp and was given an antibiotic but was then told that the urine was contaminated; no fever or chills were noted    History of Present Illness:   As above    Past Medical History:  Past Medical History:   Diagnosis Date    Cataract     Dementia     Restless leg     2nd to dementia     Past Surgical History:  Past Surgical History:   Procedure Laterality Date    HYSTERECTOMY      JOINT REPLACEMENT      REPLACEMENT TOTAL KNEE BILATERAL      2016 RIGHT  APIL LEFT      Home Meds:  Medications Prior to Admission   Medication Sig Dispense Refill Last Dose    donepezil (ARICEPT) 10 MG tablet Take 0.5 tablets by mouth 2 (Two) Times a Day.   12/10/2023 at 0800    loperamide (IMODIUM) 2 MG capsule Take 1 capsule by mouth 3 times a day.   12/10/2023 at 0800    memantine (NAMENDA) 10 MG tablet Take 1 tablet by mouth 2 (Two) Times a Day.   12/10/2023 at 0800    rOPINIRole (REQUIP) 1 MG tablet Take 1 tablet by mouth Every Night. Take 1 hour before bedtime.   2023    meloxicam (MOBIC) 15 MG tablet Take 1 tablet by mouth Daily. With food          Allergies:  No Known Allergies  Immunizations:  Immunization History   Administered Date(s) Administered    COVID-19 (PFIZER) Purple Cap Monovalent 2021, 2021     Social History:   Social History     Social History Narrative    Not on file     Social History     Socioeconomic History  "   Marital status:    Tobacco Use    Smoking status: Former     Types: Cigarettes     Quit date: 1989     Years since quittin.0    Smokeless tobacco: Never   Substance and Sexual Activity    Alcohol use: Never    Drug use: Never    Sexual activity: Defer       Family History:  No family history on file.     Review of Systems  Not obtainable    Objective:  T Max 24 hrs: Temp (24hrs), Av.8 °F (36.6 °C), Min:97.7 °F (36.5 °C), Max:97.9 °F (36.6 °C)    Vitals Ranges:   Temp:  [97.7 °F (36.5 °C)-97.9 °F (36.6 °C)] 97.9 °F (36.6 °C)  Heart Rate:  [75-83] 82  Resp:  [16] 16  BP: (131-174)/() 131/87      Exam:  /87 (BP Location: Right arm, Patient Position: Lying)   Pulse 82   Temp 97.9 °F (36.6 °C) (Oral)   Resp 16   Ht 170.2 cm (67\")   Wt 89.8 kg (198 lb)   SpO2 93%   BMI 31.01 kg/m²     General Appearance:    Alert, cooperative, no distress, appears stated age   Head:    Normocephalic, without obvious abnormality, atraumatic   Eyes:    PERRL, conjunctivae/corneas clear, EOM's intact, both eyes   Ears:    Normal external ear canals, both ears   Nose:   Nares normal, septum midline, mucosa normal, no drainage    or sinus tenderness   Throat:   Lips, mucosa, and tongue normal   Neck:   Supple, symmetrical, trachea midline, no adenopathy;     thyroid:  no enlargement/tenderness/nodules; no carotid    bruit or JVD   Back:     Symmetric, no curvature, ROM normal, no CVA tenderness   Lungs:     Decreased breath sounds bilaterally, respirations unlabored   Chest Wall:    No tenderness or deformity    Heart:    Regular rate and rhythm, S1 and S2 normal, no murmur, rub   or gallop   Abdomen:     Soft, nontender, bowel sounds active all four quadrants,     no masses, no hepatomegaly, no splenomegaly   Extremities:   Extremities normal, atraumatic, no cyanosis or edema      .    Data Review:  Labs in chart were reviewed.  WBC   Date Value Ref Range Status   12/10/2023 5.70 3.40 - 10.80 " 10*3/mm3 Final     Hemoglobin   Date Value Ref Range Status   12/10/2023 13.1 12.0 - 15.9 g/dL Final     Hematocrit   Date Value Ref Range Status   12/10/2023 39.7 34.0 - 46.6 % Final     Platelets   Date Value Ref Range Status   12/10/2023 268 140 - 450 10*3/mm3 Final     Sodium   Date Value Ref Range Status   12/10/2023 140 136 - 145 mmol/L Final     Potassium   Date Value Ref Range Status   12/10/2023 3.7 3.5 - 5.2 mmol/L Final     Chloride   Date Value Ref Range Status   12/10/2023 103 98 - 107 mmol/L Final     CO2   Date Value Ref Range Status   12/10/2023 27.0 22.0 - 29.0 mmol/L Final     BUN   Date Value Ref Range Status   12/10/2023 20 8 - 23 mg/dL Final     Creatinine   Date Value Ref Range Status   12/10/2023 0.87 0.57 - 1.00 mg/dL Final     Glucose   Date Value Ref Range Status   12/10/2023 108 (H) 65 - 99 mg/dL Final     Calcium   Date Value Ref Range Status   12/10/2023 9.8 8.6 - 10.5 mg/dL Final                Imaging Results (All)       Procedure Component Value Units Date/Time    CT Head Without Contrast [[id number]] Collected: 12/10/23 1829     Updated: 12/10/23 1837    Narrative:      CT HEAD WITHOUT CONTRAST     HISTORY: Altered mental status. Visual hallucinations.     TECHNIQUE:  Head CT includes axial imaging from the base of skull to the  vertex without intravenous contrast. Radiation dose reduction techniques  were utilized, including automated exposure control and exposure  modulation based on body size.     COMPARISON:None     FINDINGS: Within the right frontal lobe, anterior corona radiata there  is a small, rounded 7 mm focus of low density that could be related to  small vessel white matter disease or a small infarction, likely chronic  though there are no previous studies for comparison. There are no  abnormal areas of increased attenuation intra-axially to suggest  hemorrhage. No extra-axial fluid collection is observed. There is no  evidence for cerebral edema or mass effect or shift  of midline  structures. Ventricles appear normal in size and configuration. Mastoid  air cells and the visualized paranasal sinuses appear clear.       Impression:      Within the right frontal lobe white matter there is a subcentimeter  focus of low density that may be related to chronic small vessel  ischemic white matter change or represent a small area of  encephalomalacia related to infarction that is likely chronic. There is  no evidence for intracranial hemorrhage or convincing evidence for acute  intracranial abnormality. Further evaluation could be performed with MRI  if indicated.     This report was finalized on 12/10/2023 6:34 PM by Dr. Polo Mccullough M.D on Workstation: [id number]       XR Chest 1 View [[id number]] Collected: 12/10/23 1616     Updated: 12/10/23 1623    Narrative:      XR CHEST 1 VW-12/10/2023     HISTORY: Altered mental status.     Heart size is mildly enlarged. Lungs appear free of acute infiltrates.  Bones and soft tissues are otherwise unremarkable.       Impression:      1. Mild cardiomegaly.        This report was finalized on 12/10/2023 4:20 PM by Dr. Efrain Yu M.D on Workstation: [id number]                 Assessment:  Active Hospital Problems    Diagnosis  POA    **Hallucinations [R44.3]  Yes      Resolved Hospital Problems   No resolved problems to display.   Lewy body dementia  Hypertension  hyperglycemia    Plan:  Will ask neurology to see her  Fluids  Check mri brain  Monitor on telemetry  D/w patient, family and ed provider    Carly Pak MD  12/10/2023  22:31 EST

## 2023-12-11 NOTE — PLAN OF CARE
Goal Outcome Evaluation:  Plan of Care Reviewed With: patient        Progress: no change     Patient alert and oriented x4 with forgetfulness. Hip pain tolerable during ambulation.

## 2023-12-11 NOTE — CONSULTS
Neurology Consult Note    Consult Date: 12/11/2023    Referring MD: Carly Pak, *    Reason for Consult I have been asked to see the patient in neurological consultation to render advice and opinion regarding AMS    Tracy Leach is a 73 y.o. female with hypertension, Lewy body disease with hallucinations followed by [place] neurology, anxiety/depression, RLS, and arthritis who presented 12/10 with AMS.  History provided by the patient and her son.  3 weeks ago patient had worsening difficulty walking.  She saw her orthopedist who told her she needs bilateral hip replacements and she was started on Mobic.  1 week ago she started seeing visual hallucinations and had increased confusion.  They went to an immediate care center and was diagnosed with UTI and started on Cipro.  Over the weekend after starting Cipro the confusion got worse and yesterday evening her son states she was drowsy and had slurred speech so he brought her here for further evaluation.  She was back to baseline yesterday evening.  UA was unremarkable here, CT head showed right frontal chronic hypodensity, no acute findings.  CBC/CMP, UDS, alcohol level all normal/negative.  ECG showed A-fib, new diagnosis.    At baseline she lives alone and is functional for ADLs.  Family checks on her daily and helps provide meals and helps her manage her finances.  She does not drive.    Past Medical/Surgical Hx:  Past Medical History:   Diagnosis Date    Cataract     Dementia 2022    Restless leg     2nd to dementia     Past Surgical History:   Procedure Laterality Date    HYSTERECTOMY      JOINT REPLACEMENT      REPLACEMENT TOTAL KNEE BILATERAL      2016 JULY RIGHT 2017 APIL LEFT       Medications On Admission  Medications Prior to Admission   Medication Sig Dispense Refill Last Dose    donepezil (ARICEPT) 10 MG tablet Take 0.5 tablets by mouth 2 (Two) Times a Day.   12/10/2023 at 0800    loperamide (IMODIUM) 2 MG capsule Take 1 capsule by mouth 3  "times a day.   12/10/2023 at 0800    memantine (NAMENDA) 10 MG tablet Take 1 tablet by mouth 2 (Two) Times a Day.   12/10/2023 at 0800    rOPINIRole (REQUIP) 1 MG tablet Take 1 tablet by mouth Every Night. Take 1 hour before bedtime.   2023    meloxicam (MOBIC) 15 MG tablet Take 1 tablet by mouth Daily. With food          Allergies:  No Known Allergies    Social Hx:  Social History     Socioeconomic History    Marital status:    Tobacco Use    Smoking status: Former     Types: Cigarettes     Quit date: 1989     Years since quittin.0    Smokeless tobacco: Never   Substance and Sexual Activity    Alcohol use: Never    Drug use: Never    Sexual activity: Defer       Family Hx:  History reviewed. No pertinent family history.    REVIEW OF SYSTEMS:   Constitutional: [No fevers, chills, sweats or weight loss/gain]   Eye: [No change in vision, double vision, or loss of vision]   HEENT: [No headaches, tenderness, dizziness, or tinnitus. Normal smell and taste. Normal speech and swallowing]   Respiratory: [No shortness of breath, coughing, wheezing]   Cardiovascular: [No Chest pain, palpitations, syncope, ZHU]   Gastrointestinal: [Normal bowel function. No nausea, vomiting, diarrhea]   Genitourinary: [Normal bladder function]   Musculoskeletal: [No trauma, joint or neck pain, cramping or weakness] LLE>RLE tightness/muscle cramps  Skin: [No itching, burning, pain, rashes, or birthmarks]   Endocrinology: [No heat or cold intolerance]   Psychiatric: [No sleep disturbance. No anxiety or depression]   Neurologic: [See HPI, above]       Exam    BP (!) 146/106   Pulse 82   Temp 97.5 °F (36.4 °C) (Oral)   Resp 16   Ht 170.2 cm (67\")   Wt 90.2 kg (198 lb 14.7 oz)   SpO2 93%   BMI 31.16 kg/m²   General appearance: Well developed, well nourished, well groomed, alert and cooperative.   HEENT: Normocephalic.   Neck: Supple  Cardiac: Regular rate and rhythm. No murmurs.   Peripheral Vasculature: Radial pulses " "are equal and symmetric.  Chest Exam: Clear to auscultation bilaterally, no wheezes, no rhonchi.  Extremities: Normal, no edema.   Skin: No rashes or birthmarks.     Higher integrative function: Oriented to time, place, person, intact concentration and language. Spontaneous speech, fund of vocabulary are normal.   CN II: Normal visual fields.   CN III IV VI: Extraocular movements are full without nystagmus. Pupils are equal, round, and reactive to light.   CN V: Normal facial sensation.  CN VII: Facial movements are symmetric, no weakness.   CN VIII: Auditory acuity is normal.   CN IX & X: Symmetric palatal movement.   CN XI: Sternocleidomastoid and trapezius are normal. No weakness.   CN XII: The tongue is midline. No atrophy or fasciculations.   Motor: Normal muscle strength, bulk, and tone in upper and lower extremities. No fasciculations, rigidity, spasticity or abnormal movements.   Sensation: Normal light touch in all extremities.   Station and gait: Deferred.  Coordination: Finger to nose test showed no dysmetria. Heel to shin normal.           DATA:    Lab Results   Component Value Date    GLUCOSE 91 12/11/2023    CALCIUM 9.1 12/11/2023     12/11/2023    K 3.5 12/11/2023    CO2 23.2 12/11/2023     12/11/2023    BUN 20 12/11/2023    CREATININE 0.65 12/11/2023    EGFRIFAFRI >60 11/07/2022    BCR 30.8 (H) 12/11/2023    ANIONGAP 11.8 12/11/2023     Lab Results   Component Value Date    WBC 5.92 12/11/2023    HGB 12.5 12/11/2023    HCT 37.9 12/11/2023    MCV 92.4 12/11/2023     12/11/2023     No results found for: \"CHOL\"  No results found for: \"HDL\"  No results found for: \"LDL\"  No results found for: \"TRIG\"  No results found for: \"HGBA1C\"  No results found for: \"INR\", \"PROTIME\"    Imaging review: CT head images viewed by me, no acute findings seen, right frontal white matter hypodensity appears chronic.  CT Head Without Contrast    Result Date: 12/10/2023  CT HEAD WITHOUT CONTRAST  HISTORY: " Altered mental status. Visual hallucinations.  TECHNIQUE:  Head CT includes axial imaging from the base of skull to the vertex without intravenous contrast. Radiation dose reduction techniques were utilized, including automated exposure control and exposure modulation based on body size.  COMPARISON:None  FINDINGS: Within the right frontal lobe, anterior corona radiata there is a small, rounded 7 mm focus of low density that could be related to small vessel white matter disease or a small infarction, likely chronic though there are no previous studies for comparison. There are no abnormal areas of increased attenuation intra-axially to suggest hemorrhage. No extra-axial fluid collection is observed. There is no evidence for cerebral edema or mass effect or shift of midline structures. Ventricles appear normal in size and configuration. Mastoid air cells and the visualized paranasal sinuses appear clear.      Within the right frontal lobe white matter there is a subcentimeter focus of low density that may be related to chronic small vessel ischemic white matter change or represent a small area of encephalomalacia related to infarction that is likely chronic. There is no evidence for intracranial hemorrhage or convincing evidence for acute intracranial abnormality. Further evaluation could be performed with MRI if indicated.  This report was finalized on 12/10/2023 6:34 PM by Dr. Polo Mccullough M.D on Workstation: [id number]      XR Chest 1 View    Result Date: 12/10/2023  XR CHEST 1 VW-12/10/2023  HISTORY: Altered mental status.  Heart size is mildly enlarged. Lungs appear free of acute infiltrates. Bones and soft tissues are otherwise unremarkable.      1. Mild cardiomegaly.   This report was finalized on 12/10/2023 4:20 PM by Dr. Efrain Yu M.D on Workstation: [id number]       Impression/plan:  1) worsening hallucinations, confusion, and transient slurred speech, now back to baseline.  Suspect this is  related to Cipro use.  She does have A-fib so could potentially have a small stroke though has no obvious stroke deficits on exam.  MRI brain not felt to  furthermore patient would need sedation due to claustrophobia which could exacerbate her Lewy body disease.  Okay for anticoagulation from a neurology standpoint.    She is on Requip for RLS.  This could potentially be stopped if ongoing confusion.  2) A-fib, new diagnosis  3) Lewy body disease    D/W Dr Jalloh and Shana GOLD today.

## 2023-12-12 ENCOUNTER — APPOINTMENT (OUTPATIENT)
Dept: GENERAL RADIOLOGY | Facility: HOSPITAL | Age: 73
DRG: 554 | End: 2023-12-12
Payer: MEDICARE

## 2023-12-12 LAB
ANION GAP SERPL CALCULATED.3IONS-SCNC: 11.4 MMOL/L (ref 5–15)
BH CV XLRA MEAS LEFT DIST CCA EDV: 24.6 CM/SEC
BH CV XLRA MEAS LEFT DIST CCA PSV: 88.2 CM/SEC
BH CV XLRA MEAS LEFT DIST ICA EDV: 25 CM/SEC
BH CV XLRA MEAS LEFT DIST ICA PSV: 59.3 CM/SEC
BH CV XLRA MEAS LEFT ICA/CCA RATIO: 0.85
BH CV XLRA MEAS LEFT MID ICA EDV: 23.3 CM/SEC
BH CV XLRA MEAS LEFT MID ICA PSV: 60.6 CM/SEC
BH CV XLRA MEAS LEFT PROX CCA EDV: 30.7 CM/SEC
BH CV XLRA MEAS LEFT PROX CCA PSV: 93.5 CM/SEC
BH CV XLRA MEAS LEFT PROX ECA EDV: 10.5 CM/SEC
BH CV XLRA MEAS LEFT PROX ECA PSV: 66.7 CM/SEC
BH CV XLRA MEAS LEFT PROX ICA EDV: 18 CM/SEC
BH CV XLRA MEAS LEFT PROX ICA PSV: 74.6 CM/SEC
BH CV XLRA MEAS LEFT PROX SCLA PSV: 66.6 CM/SEC
BH CV XLRA MEAS LEFT VERTEBRAL A EDV: 11.3 CM/SEC
BH CV XLRA MEAS LEFT VERTEBRAL A PSV: 37.4 CM/SEC
BH CV XLRA MEAS RIGHT DIST CCA EDV: 20.4 CM/SEC
BH CV XLRA MEAS RIGHT DIST CCA PSV: 63.3 CM/SEC
BH CV XLRA MEAS RIGHT DIST ICA EDV: 18.8 CM/SEC
BH CV XLRA MEAS RIGHT DIST ICA PSV: 59.2 CM/SEC
BH CV XLRA MEAS RIGHT ICA/CCA RATIO: 1.19
BH CV XLRA MEAS RIGHT MID ICA EDV: 33.3 CM/SEC
BH CV XLRA MEAS RIGHT MID ICA PSV: 86.2 CM/SEC
BH CV XLRA MEAS RIGHT PROX CCA EDV: 10.6 CM/SEC
BH CV XLRA MEAS RIGHT PROX CCA PSV: 107.5 CM/SEC
BH CV XLRA MEAS RIGHT PROX ECA EDV: 9.1 CM/SEC
BH CV XLRA MEAS RIGHT PROX ECA PSV: 63 CM/SEC
BH CV XLRA MEAS RIGHT PROX ICA EDV: 17.6 CM/SEC
BH CV XLRA MEAS RIGHT PROX ICA PSV: 75.3 CM/SEC
BH CV XLRA MEAS RIGHT PROX SCLA PSV: 71.8 CM/SEC
BH CV XLRA MEAS RIGHT VERTEBRAL A EDV: 8.3 CM/SEC
BH CV XLRA MEAS RIGHT VERTEBRAL A PSV: 28 CM/SEC
BUN SERPL-MCNC: 20 MG/DL (ref 8–23)
BUN/CREAT SERPL: 29.4 (ref 7–25)
CALCIUM SPEC-SCNC: 9.4 MG/DL (ref 8.6–10.5)
CHLORIDE SERPL-SCNC: 104 MMOL/L (ref 98–107)
CO2 SERPL-SCNC: 23.6 MMOL/L (ref 22–29)
CREAT SERPL-MCNC: 0.68 MG/DL (ref 0.57–1)
DEPRECATED RDW RBC AUTO: 43.9 FL (ref 37–54)
EGFRCR SERPLBLD CKD-EPI 2021: 92.7 ML/MIN/1.73
ERYTHROCYTE [DISTWIDTH] IN BLOOD BY AUTOMATED COUNT: 12.9 % (ref 12.3–15.4)
GLUCOSE SERPL-MCNC: 108 MG/DL (ref 65–99)
HCT VFR BLD AUTO: 37.3 % (ref 34–46.6)
HGB BLD-MCNC: 12.9 G/DL (ref 12–15.9)
LEFT ARM BP: NORMAL MMHG
MCH RBC QN AUTO: 32.4 PG (ref 26.6–33)
MCHC RBC AUTO-ENTMCNC: 34.6 G/DL (ref 31.5–35.7)
MCV RBC AUTO: 93.7 FL (ref 79–97)
PLATELET # BLD AUTO: 231 10*3/MM3 (ref 140–450)
PMV BLD AUTO: 10.4 FL (ref 6–12)
POTASSIUM SERPL-SCNC: 3.8 MMOL/L (ref 3.5–5.2)
RBC # BLD AUTO: 3.98 10*6/MM3 (ref 3.77–5.28)
RIGHT ARM BP: NORMAL MMHG
SODIUM SERPL-SCNC: 139 MMOL/L (ref 136–145)
WBC NRBC COR # BLD AUTO: 6.64 10*3/MM3 (ref 3.4–10.8)

## 2023-12-12 PROCEDURE — 97162 PT EVAL MOD COMPLEX 30 MIN: CPT

## 2023-12-12 PROCEDURE — 25010000002 ENOXAPARIN PER 10 MG: Performed by: INTERNAL MEDICINE

## 2023-12-12 PROCEDURE — G0378 HOSPITAL OBSERVATION PER HR: HCPCS

## 2023-12-12 PROCEDURE — 72100 X-RAY EXAM L-S SPINE 2/3 VWS: CPT

## 2023-12-12 PROCEDURE — 99232 SBSQ HOSP IP/OBS MODERATE 35: CPT | Performed by: INTERNAL MEDICINE

## 2023-12-12 PROCEDURE — 80048 BASIC METABOLIC PNL TOTAL CA: CPT | Performed by: NURSE PRACTITIONER

## 2023-12-12 PROCEDURE — 85027 COMPLETE CBC AUTOMATED: CPT | Performed by: NURSE PRACTITIONER

## 2023-12-12 PROCEDURE — 72070 X-RAY EXAM THORAC SPINE 2VWS: CPT

## 2023-12-12 PROCEDURE — 97530 THERAPEUTIC ACTIVITIES: CPT

## 2023-12-12 RX ORDER — LIDOCAINE 4 G/G
2 PATCH TOPICAL
Status: DISCONTINUED | OUTPATIENT
Start: 2023-12-12 | End: 2023-12-16 | Stop reason: HOSPADM

## 2023-12-12 RX ADMIN — Medication 3 MG: at 01:28

## 2023-12-12 RX ADMIN — ROPINIROLE 1 MG: 1 TABLET, FILM COATED ORAL at 20:29

## 2023-12-12 RX ADMIN — ENOXAPARIN SODIUM 90 MG: 100 INJECTION SUBCUTANEOUS at 07:56

## 2023-12-12 RX ADMIN — MEMANTINE HYDROCHLORIDE 10 MG: 10 TABLET, FILM COATED ORAL at 08:05

## 2023-12-12 RX ADMIN — ENOXAPARIN SODIUM 90 MG: 100 INJECTION SUBCUTANEOUS at 20:29

## 2023-12-12 RX ADMIN — DOCUSATE SODIUM 50MG AND SENNOSIDES 8.6MG 2 TABLET: 8.6; 5 TABLET, FILM COATED ORAL at 08:05

## 2023-12-12 RX ADMIN — MEMANTINE HYDROCHLORIDE 10 MG: 10 TABLET, FILM COATED ORAL at 20:29

## 2023-12-12 RX ADMIN — METOPROLOL TARTRATE 12.5 MG: 25 TABLET, FILM COATED ORAL at 20:29

## 2023-12-12 RX ADMIN — DONEPEZIL HYDROCHLORIDE 5 MG: 5 TABLET, FILM COATED ORAL at 20:29

## 2023-12-12 RX ADMIN — METOPROLOL TARTRATE 25 MG: 25 TABLET, FILM COATED ORAL at 08:05

## 2023-12-12 RX ADMIN — LIDOCAINE 2 PATCH: 4 PATCH TOPICAL at 15:58

## 2023-12-12 RX ADMIN — ACETAMINOPHEN 650 MG: 325 TABLET, FILM COATED ORAL at 01:28

## 2023-12-12 RX ADMIN — ACETAMINOPHEN 650 MG: 325 TABLET, FILM COATED ORAL at 08:17

## 2023-12-12 RX ADMIN — DONEPEZIL HYDROCHLORIDE 5 MG: 5 TABLET, FILM COATED ORAL at 08:05

## 2023-12-12 NOTE — PLAN OF CARE
Problem: Adult Inpatient Plan of Care  Goal: Plan of Care Review  Outcome: Ongoing, Not Progressing  Flowsheets (Taken 12/12/2023 1623)  Plan of Care Reviewed With: patient  Outcome Evaluation: Patient's has slept when oob to chair and tylenol given which relieved her pain. This morning she had much difficulty with movement but this afternoon she has walked to the stretcher with 2  person stand by and walker. Lidocain patched placed on left knee for pain relief. Family is supportive and provide positive environment for the patient. Patient was tearful this afternoon related to awareness of her change in health status. Family supported and gave positive affirmations to the patient.  Goal: Optimal Comfort and Wellbeing  Outcome: Ongoing, Not Progressing  Intervention: Monitor Pain and Promote Comfort  Recent Flowsheet Documentation  Taken 12/12/2023 0817 by Alejandra Taylor RN  Pain Management Interventions: see MAR  Intervention: Provide Person-Centered Care  Recent Flowsheet Documentation  Taken 12/12/2023 0817 by Alejandra Taylor RN  Trust Relationship/Rapport:   choices provided   reassurance provided   thoughts/feelings acknowledged  Goal: Readiness for Transition of Care  Outcome: Ongoing, Not Progressing   Goal Outcome Evaluation:  Plan of Care Reviewed With: patient           Outcome Evaluation: Patient's has slept when oob to chair and tylenol given which relieved her pain. This morning she had much difficulty with movement but this afternoon she has walked to the stretcher with 2  person stand by and walker. Lidocain patched placed on left knee for pain relief. Family is supportive and provide positive environment for the patient. Patient was tearful this afternoon related to awareness of her change in health status. Family supported and gave positive affirmations to the patient.          alert and awake

## 2023-12-12 NOTE — PLAN OF CARE
Goal Outcome Evaluation:  Plan of Care Reviewed With: patient, son           Outcome Evaluation: Pt is a 74 yo female admitted with fall, UTI and hallucinations, hx of Lewy body dementia. Imaging unremarkable for fx or hardware malalignment. Pt has hx of B knee replacements and has upcoming hip replacement scheduled. Pt lives alone, but family reports decline in mobility the last few weeks due to pain that she has described as shooting down her legs to family. Today, pt reporting only B knee pain with weight bearing. She stood with min A and was able to take  a few small steps forward/backward with walker use. Pt could not tolerate further mobility due to signficant knee pain. At length discussion with son regarding plan of care and PT recommendation for SNF.      Anticipated Discharge Disposition (PT): skilled nursing facility

## 2023-12-12 NOTE — CASE MANAGEMENT/SOCIAL WORK
Discharge Planning Assessment  [facility]     Patient Name: Tracy Leach  MRN: [id number]  Today's Date: 12/12/2023    Admit Date: 12/10/2023    Plan: SNF private pay for Obs stay; referrals pending   Discharge Needs Assessment       Row Name 12/12/23 1723       Living Environment    People in Home alone    Current Living Arrangements home    Potentially Unsafe Housing Conditions none    Primary Care Provided by self    Provides Primary Care For no one, unable/limited ability to care for self    Family Caregiver if Needed child(lidia), adult    Quality of Family Relationships helpful;involved;supportive    Able to Return to Prior Arrangements other (see comments)       Resource/Environmental Concerns    Resource/Environmental Concerns home accessibility    Home Accessibility Concerns stairs to enter home    Transportation Concerns none       Transition Planning    Patient/Family Anticipates Transition to home with help/services;inpatient rehabilitation facility    Patient/Family Anticipated Services at Transition skilled nursing;home health care    Transportation Anticipated family or friend will provide       Discharge Needs Assessment    Readmission Within the Last 30 Days no previous admission in last 30 days    Equipment Currently Used at Home cane, straight;other (see comments)    Concerns to be Addressed decision-making;discharge planning;adjustment to diagnosis/illness;coping/stress    Anticipated Changes Related to Illness inability to care for self    Equipment Needed After Discharge other (see comments)    Discharge Facility/Level of Care Needs home with home health;nursing facility, skilled    Current Discharge Risk chronically ill;cognitively impaired;lives alone;physical impairment                   Discharge Plan       Row Name 12/12/23 1717       Plan    Plan SNF private pay for Obs stay; referrals pending    Roadmap to Recovery Yes    Patient/Family in Agreement with Plan yes    Provided Post  Acute Provider List? Yes    Provided Post Acute Provider Quality & Resource List? Yes    Delivered To Support Person    Method of Delivery Telephone    Plan Comments CCP reviewed clinicals. CCP called pts son Abdoul [phone] via outbound call, introduced self and explained CCP role. Verified facesheet and confirmed local pharmacy is [facility] [address]. He denies problems with medication costs. Family assists in medication management and reminders of pill taking. PCP is France Tomlin. Pt has POA, all 3 children, Abdoul, Pierre and Mel are POA. No copy of document on file. Pt lives alone in a home with 4-5 steps to enter. Pt does have laundry in basement, but family can assist with basement, so pt doesn't need to access. Abdoul reports a month ago pt was completely independent and did not use any DME. Pt recently started to use a cane for Hip pain. He states its been recommended from Ortho that she have her hip done. Pt has no SNF or HH hx. CCP discussed dc planning. Explained SNF would be private pay as pt is in observation, discussed HH services also. Son states family could assist for a few days but not provide 24/7 care for longer. CCP discussed following up with PT tomorrow to see her progress. In the meantime he would like ccp to check bed availability for [facility] in the [place] area (close to him in [place] and sister in [facility]). CCP explained would check for bed availability and cost and update him tomorrow. Luke SHEARER/RAMBO                  Continued Care and Services - Admitted Since 12/10/2023       Destination       Service Provider Request Status Selected Services Address Phone Fax Patient Preferred    [facility] Pending - Request Sent N/A [address] [facility] [id number] [phone] --    [facility] Pending - Request Sent N/A [address] [facility] [id number] [phone] --    [facility]  Pending - Request Sent N/A [address] [facility] [id number] [phone] --                  Expected Discharge Date and Time       Expected Discharge Date Expected Discharge Time    Dec 14, 2023            Demographic Summary       Row Name 12/12/23 1722       General Information    Admission Type observation    Referral Source admission list    Reason for Consult discharge planning    Preferred Language English       Contact Information    Permission Granted to Share Info With facility ;family/designee                   Functional Status       Row Name 12/12/23 1723       Functional Status    Usual Activity Tolerance good    Current Activity Tolerance fair       Functional Status, IADL    Medications independent;assistive person    Meal Preparation independent    Housekeeping independent    Laundry independent    Shopping independent;assistive person       Mental Status Summary    Recent Changes in Mental Status/Cognitive Functioning unable to assess       Employment/    Employment Status retired                   Psychosocial    No documentation.                  Abuse/Neglect    No documentation.                  Legal    No documentation.                  Substance Abuse    No documentation.                  Patient Forms    No documentation.                     Pallavi Duenas RN

## 2023-12-12 NOTE — THERAPY EVALUATION
Patient Name: Tracy Leach  : 1950    MRN: [id number]                              Today's Date: 2023       Admit Date: 12/10/2023    Visit Dx:     ICD-10-CM ICD-9-CM   1. Hallucinations  R44.3 780.1     Patient Active Problem List   Diagnosis    Hallucinations    Hip pain, bilateral    Knee pain, left    Lewy body dementia    HTN (hypertension)    Atrial fibrillation     Past Medical History:   Diagnosis Date    Cataract     Dementia     Restless leg     2nd to dementia     Past Surgical History:   Procedure Laterality Date    HYSTERECTOMY      JOINT REPLACEMENT      REPLACEMENT TOTAL KNEE BILATERAL      2016 JULY RIGHT 2017 APIL LEFT      General Information       Row Name 23 1059          Physical Therapy Time and Intention    Document Type evaluation  -CW     Mode of Treatment individual therapy;physical therapy  -CW       Row Name 23 1059          General Information    Patient Profile Reviewed yes  -CW     Prior Level of Function independent:;transfer;all household mobility;bed mobility  Per family, pt has declined functionally over the last few weeks  -CW     Existing Precautions/Restrictions fall  reports several falls within last 6 months  -CW     Barriers to Rehab medically complex;previous functional deficit;cognitive status  -CW       Row Name 23 1059          Living Environment    People in Home alone  -CW       Row Name 23 1059          Cognition    Orientation Status (Cognition) oriented x 3  -CW       Row Name 23 1059          Safety Issues, Functional Mobility    Impairments Affecting Function (Mobility) balance;endurance/activity tolerance;strength  -CW               User Key  (r) = Recorded By, (t) = Taken By, (c) = Cosigned By      Initials Name Provider Type    CW Khadra Bridges, PT Physical Therapist                   Mobility       Row Name 23 1100          Bed Mobility    Bed Mobility supine-sit;sit-supine  -CW     Supine-Sit  Simpson (Bed Mobility) verbal cues  -CW     Sit-Supine Simpson (Bed Mobility) verbal cues  -CW     Assistive Device (Bed Mobility) head of bed elevated;bed rails  -CW       Row Name 12/12/23 1103          Sit-Stand Transfer    Sit-Stand Simpson (Transfers) minimum assist (75% patient effort);verbal cues;1 person assist  -CW     Assistive Device (Sit-Stand Transfers) walker, front-wheeled  -CW       Row Name 12/12/23 1103          Gait/Stairs (Locomotion)    Simpson Level (Gait) verbal cues;1 person assist;minimum assist (75% patient effort)  -CW     Assistive Device (Gait) walker, front-wheeled  -CW     Distance in Feet (Gait) 2 small steps forward and back  -CW     Deviations/Abnormal Patterns (Gait) aida decreased;gait speed decreased;stride length decreased;antalgic  -CW     Bilateral Gait Deviations forward flexed posture  -CW     Comment, (Gait/Stairs) Reports significantly increased B knee pain with weightbearing  -CW               User Key  (r) = Recorded By, (t) = Taken By, (c) = Cosigned By      Initials Name Provider Type    CW Khadra Bridges, PT Physical Therapist                   Obj/Interventions       Row Name 12/12/23 1109          Range of Motion Comprehensive    General Range of Motion bilateral lower extremity ROM WFL  -CW     Comment, General Range of Motion ROM WFL  -CW       Row Name 12/12/23 1109          Strength Comprehensive (MMT)    Comment, General Manual Muscle Testing (MMT) Assessment BLE grossly 4/5, denies Knee pain with knee extension strength assessment  -       Row Name 12/12/23 1109          Motor Skills    Therapeutic Exercise other (see comments)  B AP, laq x10 reps  -       Row Name 12/12/23 1109          Balance    Balance Assessment standing static balance;standing dynamic balance;sitting static balance;sitting dynamic balance  -CW     Static Sitting Balance standby assist  -CW     Dynamic Sitting Balance standby assist  -CW     Position, Sitting  Balance sitting in chair  -CW     Static Standing Balance minimal assist  -CW     Dynamic Standing Balance minimal assist  -CW     Position/Device Used, Standing Balance supported;walker, front-wheeled  -CW       Row Name 12/12/23 1109          Sensory Assessment (Somatosensory)    Sensory Assessment (Somatosensory) sensation intact  -CW               User Key  (r) = Recorded By, (t) = Taken By, (c) = Cosigned By      Initials Name Provider Type    CW Khadra Bridges PT Physical Therapist                   Goals/Plan       Row Name 12/12/23 1111          Bed Mobility Goal 1 (PT)    Activity/Assistive Device (Bed Mobility Goal 1, PT) bed mobility activities, all  -CW     Fleming Level/Cues Needed (Bed Mobility Goal 1, PT) modified independence  -CW     Time Frame (Bed Mobility Goal 1, PT) 2 weeks  -CW       Row Name 12/12/23 1111          Transfer Goal 1 (PT)    Activity/Assistive Device (Transfer Goal 1, PT) sit-to-stand/stand-to-sit;bed-to-chair/chair-to-bed  -CW     Fleming Level/Cues Needed (Transfer Goal 1, PT) supervision required  -CW     Time Frame (Transfer Goal 1, PT) 2 weeks  -CW       Row Name 12/12/23 1111          Gait Training Goal 1 (PT)    Activity/Assistive Device (Gait Training Goal 1, PT) gait (walking locomotion)  -CW     Fleming Level (Gait Training Goal 1, PT) supervision required  -CW     Distance (Gait Training Goal 1, PT) 80'  -CW     Time Frame (Gait Training Goal 1, PT) 2 weeks  -CW       Row Name 12/12/23 1111          Therapy Assessment/Plan (PT)    Planned Therapy Interventions (PT) balance training;bed mobility training;gait training;ROM (range of motion);strengthening;patient/family education;transfer training;postural re-education;home exercise program  -CW               User Key  (r) = Recorded By, (t) = Taken By, (c) = Cosigned By      Initials Name Provider Type    Khadra Nicholson PT Physical Therapist                   Clinical Impression       Row Name  12/12/23 1408          Pain    Pretreatment Pain Rating 0/10 - no pain  -CW     Posttreatment Pain Rating 9/10  -CW     Pain Location - Side/Orientation Bilateral  -CW     Pain Location - knee  -CW     Pre/Posttreatment Pain Comment reports significant B knee pain with weightbearing, no complaint of hip pain although plans for ARACELI soon  -CW     Pain Intervention(s) Repositioned;Rest;Ambulation/increased activity  -CW       Row Name 12/12/23 1408          Plan of Care Review    Plan of Care Reviewed With patient;son  -     Outcome Evaluation Pt is a 72 yo female admitted with fall, UTI and hallucinations, hx of Lewy body dementia. Imaging unremarkable for fx or hardware malalignment. Pt has hx of B knee replacements and has upcoming hip replacement scheduled. Pt lives alone, but family reports decline in mobility the last few weeks due to pain that she has described as shooting down her legs to family. Today, pt reporting only B knee pain with weight bearing. She stood with min A and was able to take  a few small steps forward/backward with walker use. Pt could not tolerate further mobility due to signficant knee pain. At length discussion with son regarding plan of care and PT recommendation for SNF.  -CW       Row Name 12/12/23 1408          Therapy Assessment/Plan (PT)    Rehab Potential (PT) good, to achieve stated therapy goals  -     Criteria for Skilled Interventions Met (PT) yes  -CW     Therapy Frequency (PT) 5 times/wk  -CW       Row Name 12/12/23 1408          Vital Signs    O2 Delivery Pre Treatment room air  -CW       Row Name 12/12/23 1408          Positioning and Restraints    Pre-Treatment Position sitting in chair/recliner  -CW     Post Treatment Position chair  -CW     In Chair reclined;call light within reach;encouraged to call for assist;exit alarm on;notified nsg  -               User Key  (r) = Recorded By, (t) = Taken By, (c) = Cosigned By      Initials Name Provider Type    JUDAH Bridges  KIRBY Gaviria Physical Therapist                   Outcome Measures       Row Name 12/12/23 1112          How much help from another person do you currently need...    Turning from your back to your side while in flat bed without using bedrails? 3  -CW     Moving from lying on back to sitting on the side of a flat bed without bedrails? 3  -CW     Moving to and from a bed to a chair (including a wheelchair)? 3  -CW     Standing up from a chair using your arms (e.g., wheelchair, bedside chair)? 3  -CW     Climbing 3-5 steps with a railing? 1  -CW     To walk in hospital room? 2  -CW     AM-PAC 6 Clicks Score (PT) 15  -CW     Highest Level of Mobility Goal 4 --> Transfer to chair/commode  -CW       Row Name 12/12/23 1112          Functional Assessment    Outcome Measure Options AM-PAC 6 Clicks Basic Mobility (PT)  -CW               User Key  (r) = Recorded By, (t) = Taken By, (c) = Cosigned By      Initials Name Provider Type     Khadra Bridges PT Physical Therapist                                 Physical Therapy Education       Title: PT OT SLP Therapies (In Progress)       Topic: Physical Therapy (In Progress)       Point: Mobility training (Done)       Learning Progress Summary             Patient Acceptance, E, VU,NR by  at 12/12/2023 1112                         Point: Home exercise program (Not Started)       Learner Progress:  Not documented in this visit.              Point: Body mechanics (Not Started)       Learner Progress:  Not documented in this visit.              Point: Precautions (Not Started)       Learner Progress:  Not documented in this visit.                              User Key       Initials Effective Dates Name Provider Type Discipline     12/13/22 -  Khadra Bridges PT Physical Therapist PT                  PT Recommendation and Plan  Planned Therapy Interventions (PT): balance training, bed mobility training, gait training, ROM (range of motion), strengthening, patient/family  education, transfer training, postural re-education, home exercise program  Plan of Care Reviewed With: patient, son  Outcome Evaluation: Pt is a 74 yo female admitted with fall, UTI and hallucinations, hx of Lewy body dementia. Imaging unremarkable for fx or hardware malalignment. Pt has hx of B knee replacements and has upcoming hip replacement scheduled. Pt lives alone, but family reports decline in mobility the last few weeks due to pain that she has described as shooting down her legs to family. Today, pt reporting only B knee pain with weight bearing. She stood with min A and was able to take  a few small steps forward/backward with walker use. Pt could not tolerate further mobility due to signficant knee pain. At length discussion with son regarding plan of care and PT recommendation for SNF.     Time Calculation:         PT Charges       Row Name 12/12/23 1057             Time Calculation    Start Time 0859  -CW      Stop Time 0922  -CW      Time Calculation (min) 23 min  -CW      PT Received On 12/12/23  -CW      PT - Next Appointment 12/13/23  -CW      PT Goal Re-Cert Due Date 12/26/23  -CW         Time Calculation- PT    Total Timed Code Minutes- PT 18 minute(s)  -CW         Timed Charges    71817 - PT Therapeutic Activity Minutes 18  -CW         Total Minutes    Timed Charges Total Minutes 18  -CW       Total Minutes 18  -CW                User Key  (r) = Recorded By, (t) = Taken By, (c) = Cosigned By      Initials Name Provider Type    CW Khadra Bridges, PT Physical Therapist                  Therapy Charges for Today       Code Description Service Date Service Provider Modifiers Qty    79974005984  PT EVAL MOD COMPLEXITY 3 12/12/2023 Khadra Bridges, PT GP 1    [id number]  PT THERAPEUTIC ACT EA 15 MIN 12/12/2023 Khadra Bridges, PT GP 1            PT G-Codes  Outcome Measure Options: AM-PAC 6 Clicks Basic Mobility (PT)  AM-PAC 6 Clicks Score (PT): 15  PT Discharge Summary  Anticipated  Discharge Disposition (PT): skilled nursing facility    Khadra Bridges, PT  12/12/2023

## 2023-12-12 NOTE — DISCHARGE PLACEMENT REQUEST
"Tracy Brown (73 y.o. Female)       Date of Birth   1950    Social Security Number       Address   [address] [place] [address]    Home Phone   [phone]    MRN   [id number]       Scientology   Druze    Marital Status                               Admission Date   12/10/23    Admission Type   Emergency    Admitting Provider   Carly Pak MD    Attending Provider   Bogdan Barrera MD    Department, Room/Bed   [address], [address]1       Discharge Date       Discharge Disposition       Discharge Destination                                 Attending Provider: Bogdan Barrera MD    Allergies: No Known Allergies    Isolation: None   Infection: None   Code Status: CPR    Ht: 170.2 cm (67\")   Wt: 92.6 kg (204 lb 2.3 oz)    Admission Cmt: None   Principal Problem: Hallucinations [R44.3]                   Active Insurance as of 12/10/2023       Primary Coverage       Payor Plan Insurance Group Employer/Plan Group    MEDICARE MEDICARE A & B        Payor Plan Address Payor Plan Phone Number Payor Plan Fax Number Effective Dates    [address] [phone]  6/1/2016 - None Entered    [facility] [id number]         Subscriber Name Subscriber Birth Date Member ID       TRACY BROWN 1950 [id number]               Secondary Coverage       Payor Plan Insurance Group Employer/Plan Group    [employer]        Payor Plan Address Payor Plan Phone Number Payor Plan Fax Number Effective Dates    [address] [address] [phone]  12/10/2023 - None Entered    [facility] [address]         Subscriber Name Subscriber Birth Date Member ID       TRACY BROWN 1950 [id number]                     Emergency Contacts        (Rel.) Home Phone Work Phone Mobile Phone    ESTRELLA BROWN (POA) (Son) [phone] -- --    AYLIN BROWN(POA) (Son) [phone] -- --    LEIGH WOODS (POA) " (Daughter) [phone] -- --

## 2023-12-12 NOTE — PROGRESS NOTES
"[facility]    Patient Name: Tracy Leach  :1950  73 y.o.  LOS: 0  Encounter Provider: Froy Dave Jr, MD      Patient Care Team:  France Tomlin APRN as PCP - General (Family Medicine)    Chief Complaint: Follow-up newly diagnosed atrial fibrillation, Lewy body dementia    Interval History: No acute issues overnight.       Objective   Vital Signs  Temp:  [97.9 °F (36.6 °C)-98.8 °F (37.1 °C)] 98.4 °F (36.9 °C)  Heart Rate:  [74-91] 74  Resp:  [18] 18  BP: (121-145)/() 134/99    Intake/Output Summary (Last 24 hours) at 2023 1458  Last data filed at 2023 0944  Gross per 24 hour   Intake 480 ml   Output --   Net 480 ml     Flowsheet Rows      Flowsheet Row First Filed Value   Admission Height 170.2 cm (67\") Documented at 12/10/2023 1526   Admission Weight 89.8 kg (198 lb) Documented at 12/10/2023 1526              Physical Exam  Vitals reviewed.   Constitutional:       Appearance: Healthy appearance. Not in distress.   Neck:      Vascular: No JVR. JVD normal.   Pulmonary:      Effort: Pulmonary effort is normal.      Breath sounds: Normal breath sounds. No wheezing. No rhonchi. No rales.   Chest:      Chest wall: Not tender to palpatation.   Cardiovascular:      PMI at left midclavicular line. Normal rate. Irregularly irregular rhythm. Normal S1. Normal S2.       Murmurs: There is no murmur.      No gallop.  No click. No rub.   Pulses:     Intact distal pulses.   Edema:     Peripheral edema absent.   Abdominal:      General: Bowel sounds are normal.      Palpations: Abdomen is soft.      Tenderness: There is no abdominal tenderness.   Musculoskeletal: Normal range of motion.         General: No tenderness. Skin:     General: Skin is warm and dry.   Neurological:      General: No focal deficit present.     Physical exam was reviewed, updated and amended when necessary.    Pertinent Test Results:  Results from last 7 days   Lab Units 23  0429 23  0629 " "12/10/23  1550   SODIUM mmol/L 139 139 140   POTASSIUM mmol/L 3.8 3.5 3.7   CHLORIDE mmol/L 104 104 103   CO2 mmol/L 23.6 23.2 27.0   BUN mg/dL 20 20 20   CREATININE mg/dL 0.68 0.65 0.87   GLUCOSE mg/dL 108* 91 108*   CALCIUM mg/dL 9.4 9.1 9.8   AST (SGOT) U/L  --   --  18   ALT (SGPT) U/L  --   --  14         Results from last 7 days   Lab Units 12/12/23  0429 12/11/23  0629 12/10/23  1550   WBC 10*3/mm3 6.64 5.92 5.70   HEMOGLOBIN g/dL 12.9 12.5 13.1   HEMATOCRIT % 37.3 37.9 39.7   PLATELETS 10*3/mm3 231 237 268                   Invalid input(s): \"LDLCALC\"      Results from last 7 days   Lab Units 12/11/23  0629   TSH uIU/mL 0.878           Medication Review:   donepezil, 5 mg, Oral, BID  enoxaparin, 1 mg/kg, Subcutaneous, Q12H  Lidocaine, 2 patch, Transdermal, Q24H  memantine, 10 mg, Oral, Q12H  metoprolol tartrate, 12.5 mg, Oral, Q12H  rOPINIRole, 1 mg, Oral, Nightly  senna-docusate sodium, 2 tablet, Oral, BID              Assessment & Plan     Active Hospital Problems    Diagnosis  POA    **Hallucinations [R44.3]  Yes    Hip pain, bilateral [M25.551, M25.552]  Yes    Knee pain, left [M25.562]  Yes    Lewy body dementia [G31.83, F02.80]  Yes    HTN (hypertension) [I10]  Yes    Atrial fibrillation [I48.91]  Yes      Resolved Hospital Problems   No resolved problems to display.        Persistent atrial fibrillation -no previous cardiac diagnoses.  Her son feels that she does snore often but she has never been tested for sleep apnea.  She has experienced 2 falls this year.  Her [id number] score is 2 because of gender and age.  I had a long discussion with son and patient at bedside and while she is in the hospital we will keep her on anticoagulation however they will need to make a decision at time of discharge regarding the risks and benefits of stroke prevention versus bleeding risk.  They would like to continue anticoagulation as outpatient.  We will switch her over to Eliquis tomorrow.  Lewy body " dementia      Froy Dave Jr, MD  [facility]  12/12/23  14:58 EST

## 2023-12-12 NOTE — PROGRESS NOTES
Name: Tracy Leach ADMIT: 12/10/2023   : 1950  PCP: France Tomlin APRN    MRN: [id number] LOS: 0 days   AGE/SEX: 73 y.o. female  ROOM: [facility]     Subjective   Subjective   Sleeping in recliner when I entered.  PT and son at bedside.  Patient is apparently still having significant pain per her reports, but PT feels that her exam does not always add up to the pain she is reporting.  Patient tells me she is primarily having leg pain from the knee down, but does report some low back pain as well as well as hip pain.  Overall it seems that she is just having diffuse pain in her lower extremities.  She denies any chest pain, dyspnea, cough, fever or chills.  Denies any nausea vomiting or abdominal pain.     Objective   Objective   Vital Signs  Temp:  [97.9 °F (36.6 °C)-98.8 °F (37.1 °C)] 98.4 °F (36.9 °C)  Heart Rate:  [74-91] 74  Resp:  [18] 18  BP: (121-145)/() 134/99  SpO2:  [95 %-100 %] 98 %  on   ;   Device (Oxygen Therapy): room air  Body mass index is 31.97 kg/m².    Physical Exam  Vitals and nursing note reviewed.   Constitutional:       Appearance: She is obese. She is ill-appearing. She is not toxic-appearing.   Cardiovascular:      Rate and Rhythm: Normal rate. Rhythm irregular.      Pulses: Normal pulses.   Pulmonary:      Effort: Pulmonary effort is normal. No respiratory distress.      Breath sounds: Normal breath sounds.   Abdominal:      General: Bowel sounds are normal. There is no distension.      Palpations: Abdomen is soft.      Tenderness: There is no abdominal tenderness.   Musculoskeletal:         General: Tenderness (left knee) present. No swelling. Normal range of motion.      Cervical back: Normal range of motion and neck supple.   Skin:     General: Skin is warm and dry.      Findings: No bruising.   Neurological:      Mental Status: She is alert and oriented to person, place, and time.      Sensory: No sensory deficit.      Motor: Weakness present.      Coordination:  "Coordination normal.   Psychiatric:         Mood and Affect: Mood normal.         Behavior: Behavior normal.     Results Review:       I reviewed the patient's new clinical results.  Results from last 7 days   Lab Units 12/12/23  0429 12/11/23  0629 12/10/23  1550   WBC 10*3/mm3 6.64 5.92 5.70   HEMOGLOBIN g/dL 12.9 12.5 13.1   PLATELETS 10*3/mm3 231 237 268     Results from last 7 days   Lab Units 12/12/23  0429 12/11/23  0629 12/10/23  1550   SODIUM mmol/L 139 139 140   POTASSIUM mmol/L 3.8 3.5 3.7   CHLORIDE mmol/L 104 104 103   CO2 mmol/L 23.6 23.2 27.0   BUN mg/dL 20 20 20   CREATININE mg/dL 0.68 0.65 0.87   GLUCOSE mg/dL 108* 91 108*   Estimated Creatinine Clearance: 87.4 mL/min (by C-G formula based on SCr of 0.68 mg/dL).  Results from last 7 days   Lab Units 12/10/23  1550   ALBUMIN g/dL 4.6   BILIRUBIN mg/dL 0.8   ALK PHOS U/L 74   AST (SGOT) U/L 18   ALT (SGPT) U/L 14     Results from last 7 days   Lab Units 12/12/23  0429 12/11/23  0629 12/10/23  1550   CALCIUM mg/dL 9.4 9.1 9.8   ALBUMIN g/dL  --   --  4.6       No results found for: \"HGBA1C\", \"POCGLU\"    donepezil, 5 mg, Oral, BID  enoxaparin, 1 mg/kg, Subcutaneous, Q12H  memantine, 10 mg, Oral, Q12H  metoprolol tartrate, 25 mg, Oral, Q12H  rOPINIRole, 1 mg, Oral, Nightly  senna-docusate sodium, 2 tablet, Oral, BID       Diet: Cardiac Diets; Healthy Heart (2-3 Na+); Texture: Regular Texture (IDDSI 7); Fluid Consistency: Thin (IDDSI 0)       Assessment/Plan     Active Hospital Problems    Diagnosis  POA    **Hallucinations [R44.3]  Yes    Hip pain, bilateral [M25.551, M25.552]  Yes    Knee pain, left [M25.562]  Yes    Lewy body dementia [G31.83, F02.80]  Yes    HTN (hypertension) [I10]  Yes    Atrial fibrillation [I48.91]  Yes      Resolved Hospital Problems   No resolved problems to display.     Ms. Leach is a 73 y.o. female who presented to the hospital with complaints of hallucinations.  She has a known history of Lewy body dementia with prior " hallucinations that have been well-controlled with medications. She was recently complaining of some frequency of urination and felt to have a UTI.  She was given Cipro, but had worsening confusion with hallucinations.  She was evaluated by her orthopedic surgeon a few weeks ago and told that she needs bilateral hip replacements and plan to have one completed in March.  Despite her Lewy body dementia, she apparently lives independently with close family assistance.  She did apparently have a fall in the last couple weeks when she was found on the floor by family members.     Hallucinations  Lewy body dementia  -Neurology has been consulted.  -CT head with area of low density in the right frontal lobe possibly related to chronic small vessel ischemic disease versus small area of encephalomalacia related to an infarction that is slightly chronic.  -She apparently would need premedication to tolerate an MRI which would likely trigger issues with her Lewy body dementia.  For this reason, neurology does not feel MRI would  at this time and have deferred this.  -Given A-fib, she could potentially have a small stroke though does not have any obvious stroke deficits on exam.   -Monitor mental status.  -Neurology feels hallucinations/confusion secondary to Cipro use.      Atrial fibrillation  -New diagnosis without prior cardiac history.  -Cardiology has been consulted-plans for anticoagulation for now.  Metoprolol has been added.     Hypertension  -Not appear to be on any medication this.  -Having some variable BP readings. HR controlled. Cards added BB.  -BP stable today.     Bilateral hip pain  Bilateral knee pain  -With recent fall, x-rays of hip and bilateral knees obtained.  Were negative for any acute osseous or articular abnormality other than her known arthritic changes.  She does continue to report pain in the lower half of her body.  Will obtain thoracic and lumbar x-rays to rule out any  compression fractures.  Overall feel her pain is secondary to stiffness and deconditioning in the setting of arthritis.   -He is already established with Dr. Neumann who is going to replace her left hip in March.  Will need eventual bilateral replacements.  -Will add Lidoderm patches to her knees.      Discussed with patient as well as her son at bedside.  Discussed with CCP and PT.     VTE Prophylaxis - Pharmacy to dose Lovenox  Code Status - Full code  Disposition - Anticipate discharge possibly in the next 1 to 2 days.  Will need placement as SNF has been recommended and family cannot provide 24/7 assistance at home.      ASIF Wong  [facility]  12/12/23  11:15 EST

## 2023-12-13 ENCOUNTER — APPOINTMENT (OUTPATIENT)
Dept: CARDIOLOGY | Facility: HOSPITAL | Age: 73
DRG: 554 | End: 2023-12-13
Payer: MEDICARE

## 2023-12-13 LAB
ANION GAP SERPL CALCULATED.3IONS-SCNC: 10.9 MMOL/L (ref 5–15)
BH CV LOWER VASCULAR LEFT COMMON FEMORAL AUGMENT: NORMAL
BH CV LOWER VASCULAR LEFT COMMON FEMORAL COMPETENT: NORMAL
BH CV LOWER VASCULAR LEFT COMMON FEMORAL COMPRESS: NORMAL
BH CV LOWER VASCULAR LEFT COMMON FEMORAL PHASIC: NORMAL
BH CV LOWER VASCULAR LEFT COMMON FEMORAL SPONT: NORMAL
BH CV LOWER VASCULAR LEFT DISTAL FEMORAL COMPRESS: NORMAL
BH CV LOWER VASCULAR LEFT GASTRONEMIUS COMPRESS: NORMAL
BH CV LOWER VASCULAR LEFT GREATER SAPH AK COMPRESS: NORMAL
BH CV LOWER VASCULAR LEFT GREATER SAPH BK COMPRESS: NORMAL
BH CV LOWER VASCULAR LEFT LESSER SAPH COMPRESS: NORMAL
BH CV LOWER VASCULAR LEFT MID FEMORAL AUGMENT: NORMAL
BH CV LOWER VASCULAR LEFT MID FEMORAL COMPETENT: NORMAL
BH CV LOWER VASCULAR LEFT MID FEMORAL COMPRESS: NORMAL
BH CV LOWER VASCULAR LEFT MID FEMORAL PHASIC: NORMAL
BH CV LOWER VASCULAR LEFT MID FEMORAL SPONT: NORMAL
BH CV LOWER VASCULAR LEFT PERONEAL COMPRESS: NORMAL
BH CV LOWER VASCULAR LEFT POPLITEAL AUGMENT: NORMAL
BH CV LOWER VASCULAR LEFT POPLITEAL COMPETENT: NORMAL
BH CV LOWER VASCULAR LEFT POPLITEAL COMPRESS: NORMAL
BH CV LOWER VASCULAR LEFT POPLITEAL PHASIC: NORMAL
BH CV LOWER VASCULAR LEFT POPLITEAL SPONT: NORMAL
BH CV LOWER VASCULAR LEFT POSTERIOR TIBIAL COMPRESS: NORMAL
BH CV LOWER VASCULAR LEFT PROFUNDA FEMORAL COMPRESS: NORMAL
BH CV LOWER VASCULAR LEFT PROXIMAL FEMORAL COMPRESS: NORMAL
BH CV LOWER VASCULAR LEFT SAPHENOFEMORAL JUNCTION COMPRESS: NORMAL
BH CV LOWER VASCULAR RIGHT COMMON FEMORAL AUGMENT: NORMAL
BH CV LOWER VASCULAR RIGHT COMMON FEMORAL COMPETENT: NORMAL
BH CV LOWER VASCULAR RIGHT COMMON FEMORAL COMPRESS: NORMAL
BH CV LOWER VASCULAR RIGHT COMMON FEMORAL PHASIC: NORMAL
BH CV LOWER VASCULAR RIGHT COMMON FEMORAL SPONT: NORMAL
BH CV LOWER VASCULAR RIGHT DISTAL FEMORAL COMPRESS: NORMAL
BH CV LOWER VASCULAR RIGHT GASTRONEMIUS COMPRESS: NORMAL
BH CV LOWER VASCULAR RIGHT GREATER SAPH AK COMPRESS: NORMAL
BH CV LOWER VASCULAR RIGHT GREATER SAPH BK COMPRESS: NORMAL
BH CV LOWER VASCULAR RIGHT LESSER SAPH COMPRESS: NORMAL
BH CV LOWER VASCULAR RIGHT MID FEMORAL AUGMENT: NORMAL
BH CV LOWER VASCULAR RIGHT MID FEMORAL COMPETENT: NORMAL
BH CV LOWER VASCULAR RIGHT MID FEMORAL COMPRESS: NORMAL
BH CV LOWER VASCULAR RIGHT MID FEMORAL PHASIC: NORMAL
BH CV LOWER VASCULAR RIGHT MID FEMORAL SPONT: NORMAL
BH CV LOWER VASCULAR RIGHT PERONEAL COMPRESS: NORMAL
BH CV LOWER VASCULAR RIGHT POPLITEAL AUGMENT: NORMAL
BH CV LOWER VASCULAR RIGHT POPLITEAL COMPETENT: NORMAL
BH CV LOWER VASCULAR RIGHT POPLITEAL COMPRESS: NORMAL
BH CV LOWER VASCULAR RIGHT POPLITEAL PHASIC: NORMAL
BH CV LOWER VASCULAR RIGHT POPLITEAL SPONT: NORMAL
BH CV LOWER VASCULAR RIGHT POSTERIOR TIBIAL COMPRESS: NORMAL
BH CV LOWER VASCULAR RIGHT PROFUNDA FEMORAL COMPRESS: NORMAL
BH CV LOWER VASCULAR RIGHT PROXIMAL FEMORAL COMPRESS: NORMAL
BH CV LOWER VASCULAR RIGHT SAPHENOFEMORAL JUNCTION COMPRESS: NORMAL
BUN SERPL-MCNC: 15 MG/DL (ref 8–23)
BUN/CREAT SERPL: 25.9 (ref 7–25)
CALCIUM SPEC-SCNC: 9.2 MG/DL (ref 8.6–10.5)
CHLORIDE SERPL-SCNC: 104 MMOL/L (ref 98–107)
CO2 SERPL-SCNC: 24.1 MMOL/L (ref 22–29)
CREAT SERPL-MCNC: 0.58 MG/DL (ref 0.57–1)
DEPRECATED RDW RBC AUTO: 43.1 FL (ref 37–54)
EGFRCR SERPLBLD CKD-EPI 2021: 96.3 ML/MIN/1.73
ERYTHROCYTE [DISTWIDTH] IN BLOOD BY AUTOMATED COUNT: 12.8 % (ref 12.3–15.4)
GLUCOSE SERPL-MCNC: 98 MG/DL (ref 65–99)
HCT VFR BLD AUTO: 40.6 % (ref 34–46.6)
HGB BLD-MCNC: 13.6 G/DL (ref 12–15.9)
MCH RBC QN AUTO: 30.7 PG (ref 26.6–33)
MCHC RBC AUTO-ENTMCNC: 33.5 G/DL (ref 31.5–35.7)
MCV RBC AUTO: 91.6 FL (ref 79–97)
PLATELET # BLD AUTO: 247 10*3/MM3 (ref 140–450)
PMV BLD AUTO: 10.7 FL (ref 6–12)
POTASSIUM SERPL-SCNC: 3.7 MMOL/L (ref 3.5–5.2)
RBC # BLD AUTO: 4.43 10*6/MM3 (ref 3.77–5.28)
SODIUM SERPL-SCNC: 139 MMOL/L (ref 136–145)
WBC NRBC COR # BLD AUTO: 7.73 10*3/MM3 (ref 3.4–10.8)

## 2023-12-13 PROCEDURE — 25010000002 ENOXAPARIN PER 10 MG: Performed by: INTERNAL MEDICINE

## 2023-12-13 PROCEDURE — 85027 COMPLETE CBC AUTOMATED: CPT | Performed by: NURSE PRACTITIONER

## 2023-12-13 PROCEDURE — 93970 EXTREMITY STUDY: CPT

## 2023-12-13 PROCEDURE — 80048 BASIC METABOLIC PNL TOTAL CA: CPT | Performed by: NURSE PRACTITIONER

## 2023-12-13 PROCEDURE — 97530 THERAPEUTIC ACTIVITIES: CPT

## 2023-12-13 PROCEDURE — 99232 SBSQ HOSP IP/OBS MODERATE 35: CPT | Performed by: NURSE PRACTITIONER

## 2023-12-13 RX ADMIN — LIDOCAINE 2 PATCH: 4 PATCH TOPICAL at 08:40

## 2023-12-13 RX ADMIN — MEMANTINE HYDROCHLORIDE 10 MG: 10 TABLET, FILM COATED ORAL at 08:40

## 2023-12-13 RX ADMIN — ROPINIROLE 1 MG: 1 TABLET, FILM COATED ORAL at 21:03

## 2023-12-13 RX ADMIN — DONEPEZIL HYDROCHLORIDE 5 MG: 5 TABLET, FILM COATED ORAL at 08:40

## 2023-12-13 RX ADMIN — METOPROLOL TARTRATE 12.5 MG: 25 TABLET, FILM COATED ORAL at 08:41

## 2023-12-13 RX ADMIN — DOCUSATE SODIUM 50MG AND SENNOSIDES 8.6MG 2 TABLET: 8.6; 5 TABLET, FILM COATED ORAL at 08:40

## 2023-12-13 RX ADMIN — Medication 3 MG: at 00:05

## 2023-12-13 RX ADMIN — METOPROLOL TARTRATE 12.5 MG: 25 TABLET, FILM COATED ORAL at 21:02

## 2023-12-13 RX ADMIN — ACETAMINOPHEN 650 MG: 325 TABLET, FILM COATED ORAL at 00:05

## 2023-12-13 RX ADMIN — MEMANTINE HYDROCHLORIDE 10 MG: 10 TABLET, FILM COATED ORAL at 21:03

## 2023-12-13 RX ADMIN — APIXABAN 5 MG: 5 TABLET, FILM COATED ORAL at 21:03

## 2023-12-13 RX ADMIN — ENOXAPARIN SODIUM 90 MG: 100 INJECTION SUBCUTANEOUS at 08:40

## 2023-12-13 NOTE — PLAN OF CARE
Goal Outcome Evaluation:  Plan of Care Reviewed With: patient           Outcome Evaluation: Pt participated with PT this afternoon. Pain better controlled today and pt tolerated 15' ambulation with cga and rolling walker. Continue to recommend SNF as pt not safe to return home alone at her current mobility level.      Anticipated Discharge Disposition (PT): skilled nursing facility

## 2023-12-13 NOTE — CONSULTS
Orthopaedic Consultation      Patient: Tracy Leach    Date of Admission: 12/10/2023  3:29 PM    YOB: 1950    Medical Record Number: [id number]    Attending Physician:  Merrill Goyal MD    Consulting Physician:  Lit Noriega PA-C        Chief Complaints: Lower extremity pain with difficulty ambulating    History of Present Illness:     The patient is a pleasant 73-year-old female.  She initially presented to the emergency room about 3 days ago.  She has a history of Lewy body dementia but usually functions well according to family.  She had hallucinations that had been more confused.  She has had hip pain and was told that she was needing a hip replacement.  She is a patient of Dr. Vega.  She already has had both of her knees replaced.  She localizes her pain to the hips.  Extending down the legs and into the shin.  She denies any falls.  She is accompanied by her family member who is in the hospital with her.  Patient has been seen by cardiology as well as neurology.  She continues with care with the hospitalist team.  Patient has participated with physical therapy.  Pain was better controlled today and she tolerated 15 feet of ambulation with a rolling walker.  They have recommended SNF placement as the patient has not been safe to return home alone at her current mobility level.  Orthopedics has been consulted for further evaluation of her knee pain.    Allergies: No Known Allergies    Medications:   Home Medications:  Medications Prior to Admission   Medication Sig Dispense Refill Last Dose    donepezil (ARICEPT) 10 MG tablet Take 0.5 tablets by mouth 2 (Two) Times a Day.   12/10/2023 at 0800    loperamide (IMODIUM) 2 MG capsule Take 1 capsule by mouth 3 times a day.   12/10/2023 at 0800    memantine (NAMENDA) 10 MG tablet Take 1 tablet by mouth 2 (Two) Times a Day.   12/10/2023 at 0800    rOPINIRole (REQUIP) 1 MG tablet Take 1 tablet by mouth Every Night. Take 1 hour before  bedtime.   2023    meloxicam (MOBIC) 15 MG tablet Take 1 tablet by mouth Daily. With food          Current Medications:  Scheduled Meds:apixaban, 5 mg, Oral, Q12H  donepezil, 5 mg, Oral, BID  Lidocaine, 2 patch, Transdermal, Q24H  memantine, 10 mg, Oral, Q12H  metoprolol tartrate, 12.5 mg, Oral, Q12H  rOPINIRole, 1 mg, Oral, Nightly  senna-docusate sodium, 2 tablet, Oral, BID      Continuous Infusions:   PRN Meds:.  acetaminophen    senna-docusate sodium **AND** polyethylene glycol **AND** bisacodyl **AND** bisacodyl    hydrALAZINE    melatonin    ondansetron **OR** ondansetron    [COMPLETED] Insert Peripheral IV **AND** sodium chloride    Past Medical History:   Diagnosis Date    Cataract     Dementia     Restless leg     2nd to dementia     Past Surgical History:   Procedure Laterality Date    HYSTERECTOMY      JOINT REPLACEMENT      REPLACEMENT TOTAL KNEE BILATERAL      2016 RIGHT 2017 APIL LEFT     Social History     Occupational History    Not on file   Tobacco Use    Smoking status: Former     Types: Cigarettes     Quit date: 1989     Years since quittin.0     Passive exposure: Never    Smokeless tobacco: Never   Vaping Use    Vaping Use: Never used   Substance and Sexual Activity    Alcohol use: Never    Drug use: Never    Sexual activity: Defer      Social History     Social History Narrative    Not on file     History reviewed. No pertinent family history.      Review of Systems:   No other pertinent positives or negatives other than what is mentioned in the HPI and below.  Constitutional: Negative for fatigue, fever, or weight loss  HEENT: No active headache.  Pulmonary: Patient denies SOA.  Cardiovascular: Patient denies any chest pain.  Gastrointestinal:  Patient denies active vomiting or diarrhea.  Musculoskeletal: Positive for bilateral lower extremity pain.  Neurological: Patient denies active dizziness or loss of consciousness.  Skin: Patient denies any active  bleeding.    Vital signs in last 24 hours:  Temp:  [97.9 °F (36.6 °C)-98.8 °F (37.1 °C)] 98.4 °F (36.9 °C)  Heart Rate:  [80-86] 86  Resp:  [16-18] 18  BP: (132-151)/(80-94) 132/80  Vitals:    12/13/23 0000 12/13/23 0622 12/13/23 0753 12/13/23 1319   BP: 136/89  151/94 132/80   BP Location: Right arm  Right arm Right arm   Patient Position: Lying  Lying Lying   Pulse: 86      Resp: 18  18 18   Temp: 98.2 °F (36.8 °C)  98.8 °F (37.1 °C) 98.4 °F (36.9 °C)   TempSrc: Oral  Oral Oral   SpO2: 98%      Weight:  92.8 kg (204 lb 9.4 oz)     Height:              Physical Exam: 73 y.o. female         General Appearance:  Alert, cooperative, in no acute distress    HEENT:    Atraumatic, Pupils are equal   Neck:   Cervical spine midline, no appreciable JVD   Lungs:     Breathing non-labored and chest rise symmetric    Heart:   Abdomen:     Rectal:       Extremities:   Pulses  Neurovascular:   Skin:   Musculoskeletal:      Pulse regular    Soft, Non-tender or distended    Deferred        No clubbing, cyanosis, or edema    Intact    Cranial nerves 2 - 12 grossly intact, sensation intact    No skin lesions  Focused physical examination was performed.  Patient is resting comfortably in her hospital bed.  She is in no acute distress.  She is accompanied by family member in the hospital.  She has anterior incisions over the bilateral knees that are well-healed.  There is no surrounding erythema or induration or signs of infection.  No significant knee effusions.  She tolerates range of motion of the knees without pain.  Her knees are stable to ligamentous exam.  She has pain in her hip with internal and external rotation of her hip.  This does reproduce some of her pain.  She has pain patches to the bilateral shins.  She has some brawny edema to the right lower extremity.  Otherwise sensation is intact distally.  Neurovascular intact.  Foot warm and well-perfused.  Pulses intact.     Diagnostic Tests:    Results from last 7 days    Lab Units 12/13/23  0412   WBC 10*3/mm3 7.73   HEMOGLOBIN g/dL 13.6   HEMATOCRIT % 40.6   PLATELETS 10*3/mm3 247     Results from last 7 days   Lab Units 12/13/23  0412   SODIUM mmol/L 139   POTASSIUM mmol/L 3.7   CHLORIDE mmol/L 104   CO2 mmol/L 24.1   BUN mg/dL 15   CREATININE mg/dL 0.58   GLUCOSE mg/dL 98   CALCIUM mg/dL 9.2       Study Result    Narrative & Impression   XR SPINE LUMBAR 2 OR 3 VW-, XR SPINE THORACIC 2 VW-     INDICATIONS: Trauma        TECHNIQUE: 6 VIEWS OF THE LUMBAR SPINE, 3 VIEWS OF THE THORACIC SPINE     COMPARISON: None available     FINDINGS:     Mild retrolisthesis of C5 on C6. Mild anterolisthesis of L4 on L5.  Alignment is otherwise in range of normal. Vertebral body heights appear  preserved. No acute fracture is identified. Multilevel endplate  spurring, disc space narrowing, and facet arthropathy are evident. If  there is further clinical concern, MRI could be considered for further  evaluation.     IMPRESSION:     As described.           This report was finalized on 12/12/2023 4:09 PM by Dr. Rios Friedman M.D on Workstation: [id number]     Study Result    Narrative & Impression   XR KNEE 4+ VW BILATERAL-, XR HIPS BILATERAL W OR WO PELVIS 2 VIEW-     Clinical: Fell, pain     Right knee findings: Bilateral total knee replacement prostheses in  position. There is no loosening or malalignment on the right or left. No  joint effusion. No acute osseous abnormality. Soft tissues have a  satisfactory appearance.     CONCLUSION: No component loosening or malalignment, no acute osseous  abnormality seen.     Bilateral hips findings: There is moderate bilateral symmetric hip joint  narrowing. No acute osseous abnormality of the right hip or hemipelvis.  No acute osseous abnormality of the left hip or hemipelvis.     No bone lesion or avascular necrosis. The soft tissues have a  satisfactory appearance.     CONCLUSION: No acute osseous or articular abnormality.     This report was finalized  on 12/11/2023 8:55 PM by Dr. Rocael Lopez M.D on Workstation: [id number]       Assessment:    Hallucinations    Hip pain, bilateral    Knee pain, left    Lewy body dementia    HTN (hypertension)    Atrial fibrillation        Plan:      I did have an extensive discussion with the patient regards her situation in the hospital today.  Have reviewed the above.  Reviewed the x-rays.  X-rays have been reviewed by myself as well as Dr. Bailey. Discussed with the patient and family member x-ray findings.  Bilateral total knee arthroplasties with no evidence of component loosening or malalignment.  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  She also has mild anterolisthesis at L4-L5 with no fracture.  Multilevel degenerative changes noted.    Discussed with her that her symptoms could be related to her hips in regards to the knee pain.  Discussed with her that the arthritis in her hips may be referring pain down to her knees.  She has some shin pain as well.  This could be contributed by her ambulatory status as well as could be possibly related to peripheral arterial disease as she states that after she rests it gets better.  Also could be due to the degenerative changes in her lumbar spine.  However at this time no acute orthopedic intervention is warranted.  She is already scheduled to have her hips replaced with Dr. Vega.  I would recommend outpatient follow-up with them.  We will have the patient continue to work with physical therapy.  Appreciate their recommendations on whether the patient is safe to go home or if she will need to be placed in a skilled nursing facility for a brief period of time.    No orthopedic operative intervention warranted at this time.  Continue with physical therapy.  Will sign off from the orthopedic standpoint.  Please call if needed.  Thank you very much for this consultation allowing us to be part of the patient's care.       Date: 12/13/2023  Lit Noriega PA-C

## 2023-12-13 NOTE — PROGRESS NOTES
"    Name: Tracy Leach ADMIT: 12/10/2023   : 1950  PCP: France Tomlin APRN    MRN: [id number] LOS: 0 days   AGE/SEX: 73 y.o. female  ROOM: [facility]     Subjective   Subjective   Resting in bed.  Son at bedside.  Patient more alert today.  Son remains concerned about her lower extremity complaints with pain and reduced mobility.  He apparently saw her spinal x-ray on [facility] with mention of the mild anterior listhesis of L4 on L5 and wondering if this could be to explain her symptoms.  Patient states that her pain tends to \"move around.\"  Does continue to complain of some left knee pain, but x-rays the other day did not show any malalignment and hardware.  She also states that she is having some pain below the right knee.  She states she has had intermittent sciatica in the past, but denies any at present.  She denies any changes in her bowel or bladder as well as any new numbness or tingling.  She states sometimes it feels as if her knees are \"catching.\"  Denies any chest pain or trouble breathing.  Denies any nausea, vomiting or abdominal pain.  Son inquiring about sending her out for an open MRI and bringing her back to the hospital.      Objective   Objective   Vital Signs  Temp:  [97.9 °F (36.6 °C)-98.9 °F (37.2 °C)] 98.8 °F (37.1 °C)  Heart Rate:  [60-86] 86  Resp:  [16-18] 18  BP: (135-151)/(84-94) 151/94  SpO2:  [97 %-99 %] 98 %  on   ;   Device (Oxygen Therapy): room air  Body mass index is 32.04 kg/m².    Physical Exam  Vitals and nursing note reviewed.   Constitutional:       Appearance: She is obese. She is ill-appearing. She is not toxic-appearing.   Cardiovascular:      Rate and Rhythm: Normal rate. Rhythm irregular.      Pulses: Normal pulses.   Pulmonary:      Effort: Pulmonary effort is normal. No respiratory distress.      Breath sounds: Normal breath sounds.   Abdominal:      General: Bowel sounds are normal. There is no distension.      Palpations: Abdomen is soft.      Tenderness: " "There is no abdominal tenderness.   Musculoskeletal:         General: Tenderness (left knee and some right knee) present. No swelling. Normal range of motion.      Cervical back: Normal range of motion and neck supple.   Skin:     General: Skin is warm and dry.      Findings: No bruising.   Neurological:      Mental Status: She is alert and oriented to person, place, and time.      Sensory: No sensory deficit.      Motor: Weakness present.      Coordination: Coordination normal.   Psychiatric:         Mood and Affect: Mood normal.         Behavior: Behavior normal.     Results Review:       I reviewed the patient's new clinical results.  Results from last 7 days   Lab Units 12/13/23  0412 12/12/23  0429 12/11/23  0629 12/10/23  1550   WBC 10*3/mm3 7.73 6.64 5.92 5.70   HEMOGLOBIN g/dL 13.6 12.9 12.5 13.1   PLATELETS 10*3/mm3 247 231 237 268     Results from last 7 days   Lab Units 12/13/23  0412 12/12/23  0429 12/11/23  0629 12/10/23  1550   SODIUM mmol/L 139 139 139 140   POTASSIUM mmol/L 3.7 3.8 3.5 3.7   CHLORIDE mmol/L 104 104 104 103   CO2 mmol/L 24.1 23.6 23.2 27.0   BUN mg/dL 15 20 20 20   CREATININE mg/dL 0.58 0.68 0.65 0.87   GLUCOSE mg/dL 98 108* 91 108*   Estimated Creatinine Clearance: 102.6 mL/min (by C-G formula based on SCr of 0.58 mg/dL).  Results from last 7 days   Lab Units 12/10/23  1550   ALBUMIN g/dL 4.6   BILIRUBIN mg/dL 0.8   ALK PHOS U/L 74   AST (SGOT) U/L 18   ALT (SGPT) U/L 14     Results from last 7 days   Lab Units 12/13/23  0412 12/12/23  0429 12/11/23  0629 12/10/23  1550   CALCIUM mg/dL 9.2 9.4 9.1 9.8   ALBUMIN g/dL  --   --   --  4.6       No results found for: \"HGBA1C\", \"POCGLU\"    apixaban, 5 mg, Oral, Q12H  donepezil, 5 mg, Oral, BID  Lidocaine, 2 patch, Transdermal, Q24H  memantine, 10 mg, Oral, Q12H  metoprolol tartrate, 12.5 mg, Oral, Q12H  rOPINIRole, 1 mg, Oral, Nightly  senna-docusate sodium, 2 tablet, Oral, BID       Diet: Cardiac Diets; Healthy Heart (2-3 Na+); Texture: " Regular Texture (IDDSI 7); Fluid Consistency: Thin (IDDSI 0)       Assessment/Plan     Active Hospital Problems    Diagnosis  POA    **Hallucinations [R44.3]  Yes    Hip pain, bilateral [M25.551, M25.552]  Yes    Knee pain, left [M25.562]  Yes    Lewy body dementia [G31.83, F02.80]  Yes    HTN (hypertension) [I10]  Yes    Atrial fibrillation [I48.91]  Yes      Resolved Hospital Problems   No resolved problems to display.     Ms. Leach is a 73 y.o. female who presented to the hospital with complaints of hallucinations.  She has a known history of Lewy body dementia with prior hallucinations that have been well-controlled with medications. She was recently complaining of some frequency of urination and felt to have a UTI.  She was given Cipro, but had worsening confusion with hallucinations.  She was evaluated by her orthopedic surgeon a few weeks ago and told that she needs bilateral hip replacements and plan to have one completed in March.  Despite her Lewy body dementia, she apparently lives independently with close family assistance.  She did apparently have a fall in the last couple weeks when she was found on the floor by family members.     Hallucinations  Lewy body dementia  -Neurology consulted.  -CT head with area of low density in the right frontal lobe possibly related to chronic small vessel ischemic disease versus small area of encephalomalacia related to an infarction that is slightly chronic.  -She apparently would need premedication to tolerate an MRI which would likely trigger issues with her Lewy body dementia.  For this reason, neurology does not feel MRI would  at this time and have deferred this.  -Given A-fib, she could potentially have a small stroke though does not have any obvious stroke deficits on exam.   -Monitor mental status.  -Neurology feels hallucinations/confusion secondary to Cipro use.      Atrial fibrillation  -New diagnosis without prior cardiac  history.  -Cardiology has been consulted-plans for anticoagulation for now.  Metoprolol has been added.     Hypertension  -Does not appear to be on any medication this at home.  -Having some variable BP readings. HR controlled. Cards added BB.  -BP stable today.     Bilateral hip pain  Bilateral knee pain  -With recent fall, x-rays of hip and bilateral knees obtained.  Were negative for any acute osseous or articular abnormality other than her known arthritic changes.  She does continue to report pain in the lower half of her body. Thoracic and lumbar x-rays ordered to rule out any compression fractures- negative with exception of some mild retrolisthesis of C5 on C6 and mild anterolisthesis of L4 on L5. Overall feel her pain is secondary to stiffness and deconditioning in the setting of arthritis.   -He is already established with Dr. Neumann who is going to replace her left hip in March.  Will need eventual bilateral replacements.  -Given ongoing knee pain and inability to perform MRI (due to risks of sedation) will have orthopedics evaluate per family request. I am not convinced that she needs further imaging at this point as her symptoms do fluctuate.   -No red flags on exam and do not feel that her spinal issues are significant. Will obtain venous dopplers to rule out DVT as source of intermittent lower extremity pain in the setting of new afib.      Discussed with patient and her son at bedside in detail. Discussed with CCP. She was adjusted to inpatient today. Will need rehab placement unless improves prior to then to return home with .     VTE Prophylaxis - Pharmacy to dose Lovenox  Code Status - Full code  Disposition - Anticipate discharge later this week to rehab.       ASIF Wong  [facility]  12/13/23  13:00 EST

## 2023-12-13 NOTE — PLAN OF CARE
Goal Outcome Evaluation:  Plan of Care Reviewed With: patient        Progress: no change     Lidoderm patches and Tylenol effective for knee pain. VSS. Patient rested majority of shift

## 2023-12-13 NOTE — PLAN OF CARE
Problem: Adult Inpatient Plan of Care  Goal: Patient-Specific Goal (Individualized)  Outcome: Ongoing, Progressing     Problem: Adult Inpatient Plan of Care  Goal: Absence of Hospital-Acquired Illness or Injury  Outcome: Ongoing, Progressing  Intervention: Identify and Manage Fall Risk  Description: Perform standard risk assessment on admission using a validated tool or comprehensive approach appropriate to the patient; reassess fall risk frequently, with change in status or transfer to another level of care.  Communicate fall injury risk to interprofessional healthcare team.  Determine need for increased observation, equipment and environmental modification, such as low bed, signage and supportive, nonskid footwear.  Adjust safety measures to individual developmental age, stage and identified risk factors.  Reinforce the importance of safety and physical activity with patient and family.  Perform regular intentional rounding to assess need for position change, pain assessment and personal needs, including assistance with toileting.  Intervention: Prevent Skin Injury  Description: Perform a screening for skin injury risk, such as pressure or moisture associated skin damage on admission and at regular intervals throughout hospital stay.  Keep all areas of skin (especially folds) clean and dry.  Maintain adequate skin hydration.  Relieve and redistribute pressure and protect bony prominences; implement measures based on patient-specific risk factors.  Match turning and repositioning schedule to clinical condition.  Encourage weight shift frequently; assist with reposition if unable to complete independently.  Float heels off bed; avoid pressure on the Achilles tendon.  Keep skin free from extended contact with medical devices.  Encourage functional activity and mobility, as early as tolerated.  Use aids (e.g., slide boards, mechanical lift) during transfer.  Intervention: Prevent and Manage VTE (Venous  Thromboembolism) Risk  Description: Assess for VTE (venous thromboembolism) risk.  Encourage and assist with early ambulation.  Initiate and maintain compression or other therapy, as indicated, based on identified risk in accordance with organizational protocol and provider order.  Encourage both active and passive leg exercises while in bed, if unable to ambulate.   Goal Outcome Evaluation:           Progress: improving

## 2023-12-13 NOTE — CONSULTS
Purpose of the visit was to evaluate for: goals of care/advanced care planning, support for patient/family, hospice referral/discussion, pain/symptom management, withdrawal of interventions, transfer to comfort care bed/unit, and comfort care. Spoke with RN and MD as well as family and discussed palliative care, goals of care, care options, resuscitation status, [facility] scattered bed status, and discharge options.      Assessment:  Patient is palliative care appropriate given: Lewy body dementia with occasional falls and hallucinations, new onset Afib, pain in extremities that is impairing mobility.  Patient not assessed at family request.    Recommendations/Plan: No change to care plan. Contact info for inpatient palliative care, outpatient [facility] shared for future use.    Other Comments: I spoke with this patient's son Abdoul via phone to offer support and to discuss goals of care for the patient. Abdoul shared that he and his siblings were advised by staff to speak with inpatient palliative care for discussion on disease progression and supportive resources, but they are reluctant to include the patient at this time. Abdoul went onto share that the patient's  passed away suddenly in June of this year here at [facility], and their goal is to preserve her positive attitude and protect her from distress. Abdoul shared that he and his siblings feel that a discussion on palliative care is premature, but when I explained that the service can be beneficial to the patient as well as family early on in the disease process, he seemed to be a little more comfortable discussing the benefits, which we did briefly.    Abdoul and his siblings may not elect to pursue a more formal supportive discussion with Palliative Care during this admission, but I shared our contact information in care they decide to be in touch prior to discharge. I also shared the contact info for outpatient [facility]  should they elect to learn more following discharge.    Palliative Care will see again upon patient or family request.     Tonya Morfin RN

## 2023-12-13 NOTE — PROGRESS NOTES
"    Patient Name: Tracy Leach  :1950  73 y.o.      Patient Care Team:  France Tomlin APRN as PCP - General (Family Medicine)    Chief Complaint: follow up atrial fibrillation    Interval History: sitting on the side of the bed with OT. No complaints today.        Objective   Vital Signs  Temp:  [97.9 °F (36.6 °C)-98.8 °F (37.1 °C)] 98.4 °F (36.9 °C)  Heart Rate:  [80-86] 86  Resp:  [16-18] 18  BP: (132-151)/(80-94) 132/80    Intake/Output Summary (Last 24 hours) at 2023 1528  Last data filed at 2023 1100  Gross per 24 hour   Intake 420 ml   Output --   Net 420 ml     Flowsheet Rows      Flowsheet Row First Filed Value   Admission Height 170.2 cm (67\") Documented at 12/10/2023 1526   Admission Weight 89.8 kg (198 lb) Documented at 12/10/2023 1526            Physical Exam:   General Appearance:    Alert, cooperative, in no acute distress   Lungs:     Clear to auscultation.  Normal respiratory effort and rate.      Heart:    Regular rhythm and normal rate, normal S1 and S2, no murmurs, gallops or rubs.     Chest Wall:    No abnormalities observed   Abdomen:     Soft, nontender, positive bowel sounds.     Extremities:   no cyanosis, clubbing or edema.  No marked joint deformities.  Adequate musculoskeletal strength.       Results Review:    Results from last 7 days   Lab Units 23  0412   SODIUM mmol/L 139   POTASSIUM mmol/L 3.7   CHLORIDE mmol/L 104   CO2 mmol/L 24.1   BUN mg/dL 15   CREATININE mg/dL 0.58   GLUCOSE mg/dL 98   CALCIUM mg/dL 9.2         Results from last 7 days   Lab Units 23  0412   WBC 10*3/mm3 7.73   HEMOGLOBIN g/dL 13.6   HEMATOCRIT % 40.6   PLATELETS 10*3/mm3 247                           Medication Review:   apixaban, 5 mg, Oral, Q12H  donepezil, 5 mg, Oral, BID  Lidocaine, 2 patch, Transdermal, Q24H  memantine, 10 mg, Oral, Q12H  metoprolol tartrate, 12.5 mg, Oral, Q12H  rOPINIRole, 1 mg, Oral, Nightly  senna-docusate sodium, 2 tablet, Oral, BID          "     Assessment & Plan   Persistent atrial fibrillation - new. Started apixaban this morning. Risk/benefit of AC discussed extensively with Dr. Dave and family. CHADs 2 Vasc score of 2.   Bryan body dementia     HR and BP look good. She has been started on anticoagulation. Will arrange one month follow up in our office. Will see as needed.     ASIF Muniz  [facility]  12/13/23  15:28 EST

## 2023-12-14 LAB
ANION GAP SERPL CALCULATED.3IONS-SCNC: 12.4 MMOL/L (ref 5–15)
BUN SERPL-MCNC: 14 MG/DL (ref 8–23)
BUN/CREAT SERPL: 23.3 (ref 7–25)
CALCIUM SPEC-SCNC: 9.2 MG/DL (ref 8.6–10.5)
CHLORIDE SERPL-SCNC: 103 MMOL/L (ref 98–107)
CO2 SERPL-SCNC: 23.6 MMOL/L (ref 22–29)
CREAT SERPL-MCNC: 0.6 MG/DL (ref 0.57–1)
DEPRECATED RDW RBC AUTO: 44.7 FL (ref 37–54)
EGFRCR SERPLBLD CKD-EPI 2021: 95.5 ML/MIN/1.73
ERYTHROCYTE [DISTWIDTH] IN BLOOD BY AUTOMATED COUNT: 12.9 % (ref 12.3–15.4)
GLUCOSE SERPL-MCNC: 110 MG/DL (ref 65–99)
HCT VFR BLD AUTO: 44 % (ref 34–46.6)
HGB BLD-MCNC: 14.1 G/DL (ref 12–15.9)
MCH RBC QN AUTO: 30.5 PG (ref 26.6–33)
MCHC RBC AUTO-ENTMCNC: 32 G/DL (ref 31.5–35.7)
MCV RBC AUTO: 95 FL (ref 79–97)
PLATELET # BLD AUTO: 246 10*3/MM3 (ref 140–450)
PMV BLD AUTO: 10.7 FL (ref 6–12)
POTASSIUM SERPL-SCNC: 3.6 MMOL/L (ref 3.5–5.2)
RBC # BLD AUTO: 4.63 10*6/MM3 (ref 3.77–5.28)
SODIUM SERPL-SCNC: 139 MMOL/L (ref 136–145)
WBC NRBC COR # BLD AUTO: 6.99 10*3/MM3 (ref 3.4–10.8)

## 2023-12-14 PROCEDURE — 80048 BASIC METABOLIC PNL TOTAL CA: CPT | Performed by: NURSE PRACTITIONER

## 2023-12-14 PROCEDURE — 85027 COMPLETE CBC AUTOMATED: CPT | Performed by: NURSE PRACTITIONER

## 2023-12-14 RX ADMIN — Medication 3 MG: at 22:54

## 2023-12-14 RX ADMIN — MEMANTINE HYDROCHLORIDE 10 MG: 10 TABLET, FILM COATED ORAL at 09:01

## 2023-12-14 RX ADMIN — ACETAMINOPHEN 650 MG: 325 TABLET, FILM COATED ORAL at 14:08

## 2023-12-14 RX ADMIN — DONEPEZIL HYDROCHLORIDE 5 MG: 5 TABLET, FILM COATED ORAL at 09:01

## 2023-12-14 RX ADMIN — ROPINIROLE 1 MG: 1 TABLET, FILM COATED ORAL at 21:27

## 2023-12-14 RX ADMIN — ACETAMINOPHEN 650 MG: 325 TABLET, FILM COATED ORAL at 00:20

## 2023-12-14 RX ADMIN — DOCUSATE SODIUM 50MG AND SENNOSIDES 8.6MG 2 TABLET: 8.6; 5 TABLET, FILM COATED ORAL at 09:02

## 2023-12-14 RX ADMIN — LIDOCAINE 2 PATCH: 4 PATCH TOPICAL at 09:02

## 2023-12-14 RX ADMIN — MEMANTINE HYDROCHLORIDE 10 MG: 10 TABLET, FILM COATED ORAL at 21:28

## 2023-12-14 RX ADMIN — METOPROLOL TARTRATE 12.5 MG: 25 TABLET, FILM COATED ORAL at 09:00

## 2023-12-14 RX ADMIN — DONEPEZIL HYDROCHLORIDE 5 MG: 5 TABLET, FILM COATED ORAL at 00:16

## 2023-12-14 RX ADMIN — METOPROLOL TARTRATE 12.5 MG: 25 TABLET, FILM COATED ORAL at 21:27

## 2023-12-14 RX ADMIN — APIXABAN 5 MG: 5 TABLET, FILM COATED ORAL at 21:27

## 2023-12-14 RX ADMIN — DONEPEZIL HYDROCHLORIDE 5 MG: 5 TABLET, FILM COATED ORAL at 21:27

## 2023-12-14 RX ADMIN — ACETAMINOPHEN 650 MG: 325 TABLET, FILM COATED ORAL at 21:27

## 2023-12-14 RX ADMIN — APIXABAN 5 MG: 5 TABLET, FILM COATED ORAL at 09:01

## 2023-12-14 NOTE — PLAN OF CARE
Goal Outcome Evaluation:      Patient is alert and on room air. No acute changes at this time. Son in patient room. Bed set to lowest position, call light within reach.

## 2023-12-14 NOTE — PROGRESS NOTES
Name: Tracy Leach ADMIT: 12/10/2023   : 1950  PCP: France Tomlin APRN    MRN: [id number] LOS: 1 days   AGE/SEX: 73 y.o. female  ROOM: [facility]     Subjective   Subjective   Sitting up in chair.  Son at bedside.  Patient apparently had a rough night with some increased confusion as she called him around 1 AM thinking that people were coming in and out of her room.  Thinks that she just had a bad dream and really did not get a good amount of sleep.  Patient denies any chest pain or trouble breathing.  She denies any nausea or vomiting.  She states that the pain in her lower extremities is about the same.  She was able to walk fairly well from the bed to the bathroom earlier.     Objective   Objective   Vital Signs  Temp:  [97.8 °F (36.6 °C)-98.2 °F (36.8 °C)] 98.2 °F (36.8 °C)  Heart Rate:  [80-94] 80  Resp:  [18] 18  BP: (134-163)/() 149/98  SpO2:  [97 %-100 %] 100 %  on   ;   Device (Oxygen Therapy): room air  Body mass index is 32.04 kg/m².    Physical Exam  Vitals and nursing note reviewed.   Constitutional:       Appearance: She is obese. She is ill-appearing. She is not toxic-appearing.   Cardiovascular:      Rate and Rhythm: Normal rate. Rhythm irregular.      Pulses: Normal pulses.   Pulmonary:      Effort: Pulmonary effort is normal. No respiratory distress.      Breath sounds: Normal breath sounds.   Abdominal:      General: Bowel sounds are normal. There is no distension.      Palpations: Abdomen is soft.      Tenderness: There is no abdominal tenderness.   Musculoskeletal:         General: Tenderness (lower extremities) present. No swelling. Normal range of motion.      Cervical back: Normal range of motion and neck supple.   Skin:     General: Skin is warm and dry.      Findings: No bruising.   Neurological:      Mental Status: She is alert and oriented to person, place, and time.      Sensory: No sensory deficit.      Motor: Weakness present.      Coordination: Coordination  "normal.   Psychiatric:         Mood and Affect: Mood normal.         Behavior: Behavior normal.     Results Review:       I reviewed the patient's new clinical results.  Results from last 7 days   Lab Units 12/14/23 0415 12/13/23 0412 12/12/23 0429 12/11/23 0629   WBC 10*3/mm3 6.99 7.73 6.64 5.92   HEMOGLOBIN g/dL 14.1 13.6 12.9 12.5   PLATELETS 10*3/mm3 246 247 231 237     Results from last 7 days   Lab Units 12/14/23 0415 12/13/23 0412 12/12/23 0429 12/11/23 0629   SODIUM mmol/L 139 139 139 139   POTASSIUM mmol/L 3.6 3.7 3.8 3.5   CHLORIDE mmol/L 103 104 104 104   CO2 mmol/L 23.6 24.1 23.6 23.2   BUN mg/dL 14 15 20 20   CREATININE mg/dL 0.60 0.58 0.68 0.65   GLUCOSE mg/dL 110* 98 108* 91   Estimated Creatinine Clearance: 99.1 mL/min (by C-G formula based on SCr of 0.6 mg/dL).  Results from last 7 days   Lab Units 12/10/23  1550   ALBUMIN g/dL 4.6   BILIRUBIN mg/dL 0.8   ALK PHOS U/L 74   AST (SGOT) U/L 18   ALT (SGPT) U/L 14     Results from last 7 days   Lab Units 12/14/23  0415 12/13/23  0412 12/12/23  0429 12/11/23  0629 12/10/23  1550   CALCIUM mg/dL 9.2 9.2 9.4 9.1 9.8   ALBUMIN g/dL  --   --   --   --  4.6       No results found for: \"HGBA1C\", \"POCGLU\"    apixaban, 5 mg, Oral, Q12H  donepezil, 5 mg, Oral, BID  Lidocaine, 2 patch, Transdermal, Q24H  memantine, 10 mg, Oral, Q12H  metoprolol tartrate, 12.5 mg, Oral, Q12H  rOPINIRole, 1 mg, Oral, Nightly  senna-docusate sodium, 2 tablet, Oral, BID       Diet: Cardiac Diets; Healthy Heart (2-3 Na+); Texture: Regular Texture (IDDSI 7); Fluid Consistency: Thin (IDDSI 0)       Assessment/Plan     Active Hospital Problems    Diagnosis  POA    **Hallucinations [R44.3]  Yes    Hip pain, bilateral [M25.551, M25.552]  Yes    Knee pain, left [M25.562]  Yes    Lewy body dementia [G31.83, F02.80]  Yes    HTN (hypertension) [I10]  Yes    Atrial fibrillation [I48.91]  Yes      Resolved Hospital Problems   No resolved problems to display.     Ms. Leach is a 73 y.o. " female who presented to the hospital with complaints of hallucinations.  She has a known history of Lewy body dementia with prior hallucinations that have been well-controlled with medications. She was recently complaining of some frequency of urination and felt to have a UTI.  She was given Cipro, but had worsening confusion with hallucinations.  She was evaluated by her orthopedic surgeon a few weeks ago and told that she needs bilateral hip replacements and plan to have one completed in March.  Despite her Lewy body dementia, she apparently lives independently with close family assistance.  She did apparently have a fall in the last couple weeks when she was found on the floor by family members.     Hallucinations  Lewy body dementia  -Neurology consulted.  -CT head with area of low density in the right frontal lobe possibly related to chronic small vessel ischemic disease versus small area of encephalomalacia related to an infarction that is slightly chronic.  -She apparently would need premedication to tolerate an MRI which would likely trigger issues with her Lewy body dementia.  For this reason, neurology does not feel MRI would  at this time and have deferred this.  -Given A-fib, she could potentially have a small stroke though does not have any obvious stroke deficits on exam.   -Monitor mental status.  -Neurology feels hallucinations/confusion secondary to Cipro use.      Atrial fibrillation  -New diagnosis without prior cardiac history.  -Cardiology has been consulted-plans for anticoagulation for now.  Metoprolol has been added.     Hypertension  -Does not appear to be on any medication this at home.  -Having some variable BP readings. HR controlled. Cards added BB.  -BP stable today.     Bilateral hip pain  Bilateral knee pain  -With recent fall, x-rays of hip and bilateral knees obtained.  Were negative for any acute osseous or articular abnormality other than her known arthritic  changes.  She does continue to report pain in the lower half of her body. Thoracic and lumbar x-rays ordered to rule out any compression fractures- negative with exception of some mild retrolisthesis of C5 on C6 and mild anterolisthesis of L4 on L5. Overall feel her pain is secondary to stiffness and deconditioning in the setting of arthritis.   -He is already established with Dr. Neumann who is going to replace her left hip in March.  Will need eventual bilateral replacements.  -Venous dopplers negative.  -Orthopedics consulted for further recommendations- appreciate their assistance. They recommend PT and no operative interventions. Continue outpatient follow up with Dr. Vega.     Discussed with patient and her son.     VTE Prophylaxis - Eliquis  Code Status - Full code  Disposition - Anticipate discharge later this week- probably Saturday morning.       ASIF Wong  [facility]  12/14/23  14:40 EST

## 2023-12-15 PROCEDURE — 97530 THERAPEUTIC ACTIVITIES: CPT

## 2023-12-15 PROCEDURE — 97116 GAIT TRAINING THERAPY: CPT

## 2023-12-15 RX ADMIN — APIXABAN 5 MG: 5 TABLET, FILM COATED ORAL at 20:36

## 2023-12-15 RX ADMIN — Medication 3 MG: at 20:35

## 2023-12-15 RX ADMIN — MEMANTINE HYDROCHLORIDE 10 MG: 10 TABLET, FILM COATED ORAL at 20:35

## 2023-12-15 RX ADMIN — DOCUSATE SODIUM 50MG AND SENNOSIDES 8.6MG 2 TABLET: 8.6; 5 TABLET, FILM COATED ORAL at 08:01

## 2023-12-15 RX ADMIN — Medication 10 ML: at 08:04

## 2023-12-15 RX ADMIN — DONEPEZIL HYDROCHLORIDE 5 MG: 5 TABLET, FILM COATED ORAL at 20:35

## 2023-12-15 RX ADMIN — ROPINIROLE 1 MG: 1 TABLET, FILM COATED ORAL at 20:35

## 2023-12-15 RX ADMIN — MEMANTINE HYDROCHLORIDE 10 MG: 10 TABLET, FILM COATED ORAL at 07:59

## 2023-12-15 RX ADMIN — LIDOCAINE 2 PATCH: 4 PATCH TOPICAL at 08:00

## 2023-12-15 RX ADMIN — DOCUSATE SODIUM 50MG AND SENNOSIDES 8.6MG 2 TABLET: 8.6; 5 TABLET, FILM COATED ORAL at 20:35

## 2023-12-15 RX ADMIN — APIXABAN 5 MG: 5 TABLET, FILM COATED ORAL at 07:57

## 2023-12-15 RX ADMIN — ACETAMINOPHEN 650 MG: 325 TABLET, FILM COATED ORAL at 07:55

## 2023-12-15 RX ADMIN — DONEPEZIL HYDROCHLORIDE 5 MG: 5 TABLET, FILM COATED ORAL at 07:58

## 2023-12-15 RX ADMIN — METOPROLOL TARTRATE 12.5 MG: 25 TABLET, FILM COATED ORAL at 07:57

## 2023-12-15 RX ADMIN — ACETAMINOPHEN 650 MG: 325 TABLET, FILM COATED ORAL at 20:40

## 2023-12-15 RX ADMIN — METOPROLOL TARTRATE 12.5 MG: 25 TABLET, FILM COATED ORAL at 20:35

## 2023-12-15 NOTE — PROGRESS NOTES
Name: Tracy Leach ADMIT: 12/10/2023   : 1950  PCP: France Tomlin APRN    MRN: [id number] LOS: 2 days   AGE/SEX: 73 y.o. female  ROOM: [facility]     Subjective   Subjective   Resting in chair.  Son at bedside.  Son reports still some intermittent confusion, but patient feels she is doing better.  She denies any pain in her lower extremities today.  Has not been getting very good sleep at night due to beeping machines. She denies any chest pain, dyspnea, cough, fever or chills.  Denies any nausea, vomiting or abdominal pain.  Plan is still for rehab hopefully tomorrow.     Objective   Objective   Vital Signs  Temp:  [97.9 °F (36.6 °C)-98.2 °F (36.8 °C)] 97.9 °F (36.6 °C)  Heart Rate:  [] 77  Resp:  [18] 18  BP: (116-178)/() 116/81  SpO2:  [97 %-100 %] 99 %  on   ;   Device (Oxygen Therapy): room air  Body mass index is 32.04 kg/m².    Physical Exam  Vitals and nursing note reviewed.   Constitutional:       Appearance: She is obese. She is ill-appearing. She is not toxic-appearing.   Cardiovascular:      Rate and Rhythm: Normal rate. Rhythm irregular.      Pulses: Normal pulses.   Pulmonary:      Effort: Pulmonary effort is normal. No respiratory distress.      Breath sounds: Normal breath sounds.   Abdominal:      General: Bowel sounds are normal. There is no distension.      Palpations: Abdomen is soft.      Tenderness: There is no abdominal tenderness.   Musculoskeletal:         General: No swelling. Normal range of motion.      Cervical back: Normal range of motion and neck supple.   Skin:     General: Skin is warm and dry.      Findings: No bruising.   Neurological:      Mental Status: She is alert and oriented to person, place, and time.      Sensory: No sensory deficit.      Motor: Weakness present.      Coordination: Coordination normal.   Psychiatric:         Mood and Affect: Mood normal.         Behavior: Behavior normal.      Results Review:       I reviewed the patient's new  "clinical results.  Results from last 7 days   Lab Units 12/14/23  0415 12/13/23 0412 12/12/23 0429 12/11/23  0629   WBC 10*3/mm3 6.99 7.73 6.64 5.92   HEMOGLOBIN g/dL 14.1 13.6 12.9 12.5   PLATELETS 10*3/mm3 246 247 231 237     Results from last 7 days   Lab Units 12/14/23  0415 12/13/23 0412 12/12/23 0429 12/11/23  0629   SODIUM mmol/L 139 139 139 139   POTASSIUM mmol/L 3.6 3.7 3.8 3.5   CHLORIDE mmol/L 103 104 104 104   CO2 mmol/L 23.6 24.1 23.6 23.2   BUN mg/dL 14 15 20 20   CREATININE mg/dL 0.60 0.58 0.68 0.65   GLUCOSE mg/dL 110* 98 108* 91   Estimated Creatinine Clearance: 99.1 mL/min (by C-G formula based on SCr of 0.6 mg/dL).  Results from last 7 days   Lab Units 12/10/23  1550   ALBUMIN g/dL 4.6   BILIRUBIN mg/dL 0.8   ALK PHOS U/L 74   AST (SGOT) U/L 18   ALT (SGPT) U/L 14     Results from last 7 days   Lab Units 12/14/23  0415 12/13/23 0412 12/12/23 0429 12/11/23 0629 12/10/23  1550   CALCIUM mg/dL 9.2 9.2 9.4 9.1 9.8   ALBUMIN g/dL  --   --   --   --  4.6       No results found for: \"HGBA1C\", \"POCGLU\"    apixaban, 5 mg, Oral, Q12H  donepezil, 5 mg, Oral, BID  Lidocaine, 2 patch, Transdermal, Q24H  memantine, 10 mg, Oral, Q12H  metoprolol tartrate, 12.5 mg, Oral, Q12H  rOPINIRole, 1 mg, Oral, Nightly  senna-docusate sodium, 2 tablet, Oral, BID       Diet: Cardiac Diets; Healthy Heart (2-3 Na+); Texture: Regular Texture (IDDSI 7); Fluid Consistency: Thin (IDDSI 0)       Assessment/Plan     Active Hospital Problems    Diagnosis  POA    **Hallucinations [R44.3]  Yes    Hip pain, bilateral [M25.551, M25.552]  Yes    Knee pain, left [M25.562]  Yes    Lewy body dementia [G31.83, F02.80]  Yes    HTN (hypertension) [I10]  Yes    Atrial fibrillation [I48.91]  Yes      Resolved Hospital Problems   No resolved problems to display.     Ms. Leach is a 73 y.o. female who presented to the hospital with complaints of hallucinations.  She has a known history of Lewy body dementia with prior hallucinations that " have been well-controlled with medications. She was recently complaining of some frequency of urination and felt to have a UTI.  She was given Cipro, but had worsening confusion with hallucinations.  She was evaluated by her orthopedic surgeon a few weeks ago and told that she needs bilateral hip replacements and plan to have one completed in March.  Despite her Lewy body dementia, she apparently lives independently with close family assistance.  She did apparently have a fall in the last couple weeks when she was found on the floor by family members.     Hallucinations  Lewy body dementia  -Neurology consulted.  -CT head with area of low density in the right frontal lobe possibly related to chronic small vessel ischemic disease versus small area of encephalomalacia related to an infarction that is slightly chronic.  -She apparently would need premedication to tolerate an MRI which would likely trigger issues with her Lewy body dementia.  For this reason, neurology does not feel MRI would  at this time and have deferred this.  -Given A-fib, she could potentially have a small stroke though does not have any obvious stroke deficits on exam.   -Monitor mental status.  -Neurology feels hallucinations/confusion secondary to Cipro use.   -Does have some waxing and waning confusion while admitted but primarily at nighttime and likely sundowning in the setting of hospital stay.  She seems to be doing fairly well during the daytime.     Atrial fibrillation  -New diagnosis without prior cardiac history.  -Cardiology has been consulted-plans for anticoagulation for now.  Metoprolol has been added.     Hypertension  -Does not appear to be on any medication this at home.  -Having some variable BP readings. HR controlled. Cards added BB.  -BP stable today.     Bilateral hip pain  Bilateral knee pain   -With recent fall, x-rays of hip and bilateral knees obtained.  Were negative for any acute osseous or articular  abnormality other than her known arthritic changes.  She does continue to report pain in the lower half of her body. Thoracic and lumbar x-rays ordered to rule out any compression fractures- negative with exception of some mild retrolisthesis of C5 on C6 and mild anterolisthesis of L4 on L5. Overall feel her pain is secondary to stiffness and deconditioning in the setting of arthritis.   -He is already established with Dr. Neumann who is going to replace her left hip in March.  Will need eventual bilateral replacements.  -Venous dopplers negative.  -Orthopedics consulted for further recommendations- appreciate their assistance. They recommend PT and non operative interventions. Continue outpatient follow up with Dr. Vega.    Discussed with patient, son and CCP.    Medically ready for discharge if bed available tomorrow.       VTE Prophylaxis -Eliquis  Code Status - Full code  Disposition - Anticipate discharge tomorrow.    ASIF Wong  [facility]  12/15/23  10:48 EST

## 2023-12-15 NOTE — PLAN OF CARE
Goal Outcome Evaluation:  Plan of Care Reviewed With: patient        Progress: improving  Outcome Evaluation: Pt tolerated treatment fair this date. Required SBA for bed mobility, then CGA-min A to stand. Pt ambulated 25ft w/ Rw and CGA-min A, some assist and cues to navigate walker as pt was impulsive and also leaving walker behind while turning.      Anticipated Discharge Disposition (PT): skilled nursing facility

## 2023-12-15 NOTE — THERAPY TREATMENT NOTE
Patient Name: Tracy Leach  : 1950    MRN: [id number]                              Today's Date: 12/15/2023       Admit Date: 12/10/2023    Visit Dx:     ICD-10-CM ICD-9-CM   1. Hallucinations  R44.3 780.1     Patient Active Problem List   Diagnosis    Hallucinations    Hip pain, bilateral    Knee pain, left    Lewy body dementia    HTN (hypertension)    Atrial fibrillation     Past Medical History:   Diagnosis Date    Cataract     Dementia     Restless leg     2nd to dementia     Past Surgical History:   Procedure Laterality Date    HYSTERECTOMY      JOINT REPLACEMENT      REPLACEMENT TOTAL KNEE BILATERAL      2016 JULY RIGHT 2017 APIL LEFT      General Information       Row Name 12/15/23 1657          Physical Therapy Time and Intention    Document Type therapy note (daily note)  -     Mode of Treatment physical therapy  -       Row Name 12/15/23 1657          General Information    Existing Precautions/Restrictions fall  -       Row Name 12/15/23 1657          Cognition    Orientation Status (Cognition) oriented to;person  -               User Key  (r) = Recorded By, (t) = Taken By, (c) = Cosigned By      Initials Name Provider Type     Arlen Paulino PTA Physical Therapist Assistant                   Mobility       Row Name 12/15/23 1657          Bed Mobility    Bed Mobility supine-sit  -     Supine-Sit Russell (Bed Mobility) standby assist  -     Assistive Device (Bed Mobility) bed rails;head of bed elevated  -       Row Name 12/15/23 1657          Sit-Stand Transfer    Sit-Stand Russell (Transfers) contact guard;minimum assist (75% patient effort)  -     Assistive Device (Sit-Stand Transfers) walker, front-wheeled  -       Row Name 12/15/23 1657          Gait/Stairs (Locomotion)    Russell Level (Gait) contact guard;minimum assist (75% patient effort)  -     Assistive Device (Gait) walker, front-wheeled  -     Distance in Feet (Gait) 25  -      Deviations/Abnormal Patterns (Gait) aida decreased;stride length decreased  -     Bilateral Gait Deviations forward flexed posture  -     Comment, (Gait/Stairs) cues and assist for pt to keep walker w/ her  -               User Key  (r) = Recorded By, (t) = Taken By, (c) = Cosigned By      Initials Name Provider Type    Arlen Crain PTA Physical Therapist Assistant                   Obj/Interventions    No documentation.                  Goals/Plan    No documentation.                  Clinical Impression       Row Name 12/15/23 1703          Pain    Pretreatment Pain Rating 0/10 - no pain  -     Posttreatment Pain Rating 4/10  -SM     Pain Location - Side/Orientation Bilateral  -     Pain Location lower  -SM     Pain Location - extremity  -     Pain Intervention(s) Repositioned;Ambulation/increased activity;Rest  -       Row Name 12/15/23 1703          Positioning and Restraints    Pre-Treatment Position in bed  -     Post Treatment Position chair  -SM     In Chair reclined;call light within reach;encouraged to call for assist;exit alarm on  -               User Key  (r) = Recorded By, (t) = Taken By, (c) = Cosigned By      Initials Name Provider Type    Arlen Crain PTA Physical Therapist Assistant                   Outcome Measures       Row Name 12/15/23 1703 12/15/23 0755       How much help from another person do you currently need...    Turning from your back to your side while in flat bed without using bedrails? 3  -SM 3  -AH    Moving from lying on back to sitting on the side of a flat bed without bedrails? 3  -SM 3  -AH    Moving to and from a bed to a chair (including a wheelchair)? 3  -SM 3  -AH    Standing up from a chair using your arms (e.g., wheelchair, bedside chair)? 3  -SM 3  -AH    Climbing 3-5 steps with a railing? 1  -SM 2  -AH    To walk in hospital room? 3  -SM 3  -AH    AM-PAC 6 Clicks Score (PT) 16  -SM 17  -AH    Highest Level of Mobility Goal 5  --> Static standing  - 5 --> Static standing  -      Row Name 12/15/23 1703          Functional Assessment    Outcome Measure Options AM-PAC 6 Clicks Basic Mobility (PT)  -               User Key  (r) = Recorded By, (t) = Taken By, (c) = Cosigned By      Initials Name Provider Type     Arlen Paulino PTA Physical Therapist Assistant    Alejandra Ravi, RN Registered Nurse                                 Physical Therapy Education       Title: PT OT SLP Therapies (Done)       Topic: Physical Therapy (Done)       Point: Mobility training (Done)       Learning Progress Summary             Patient Acceptance, E,TB,D, VU,NR by  at 12/15/2023 1703    Acceptance, E, VU,NR by  at 12/12/2023 1112                         Point: Home exercise program (Done)       Learning Progress Summary             Patient Acceptance, E,TB,D, VU,NR by  at 12/15/2023 1703                         Point: Body mechanics (Done)       Learning Progress Summary             Patient Acceptance, E,TB,D, VU,NR by  at 12/15/2023 1703                         Point: Precautions (Done)       Learning Progress Summary             Patient Acceptance, E,TB,D, VU,NR by  at 12/15/2023 1703                                         User Key       Initials Effective Dates Name Provider Type Discipline     03/07/18 -  Arlen Paulino PTA Physical Therapist Assistant PT     12/13/22 -  Khadra Bridges PT Physical Therapist PT                  PT Recommendation and Plan     Plan of Care Reviewed With: patient  Progress: improving  Outcome Evaluation: Pt tolerated treatment fair this date. Required SBA for bed mobility, then CGA-min A to stand. Pt ambulated 25ft w/ Rw and CGA-min A, some assist and cues to navigate walker as pt was impulsive and also leaving walker behind while turning.     Time Calculation:         PT Charges       Row Name 12/15/23 1706             Time Calculation    Start Time 1558  -      Stop Time 1626  -       Time Calculation (min) 28 min  -      PT Received On 12/15/23  -GEORGE      PT - Next Appointment 12/18/23  -                User Key  (r) = Recorded By, (t) = Taken By, (c) = Cosigned By      Initials Name Provider Type    Arlen Crain PTA Physical Therapist Assistant                  Therapy Charges for Today       Code Description Service Date Service Provider Modifiers Qty    54184385363 HC GAIT TRAINING EA 15 MIN 12/15/2023 Arlen Paulino PTA GP 1    [id number] HC PT THERAPEUTIC ACT EA 15 MIN 12/15/2023 Arlen Paulino PTA GP 1            PT G-Codes  Outcome Measure Options: AM-PAC 6 Clicks Basic Mobility (PT)  AM-PAC 6 Clicks Score (PT): 16  PT Discharge Summary  Anticipated Discharge Disposition (PT): skilled nursing facility    Arlen Paulino PTA  12/15/2023

## 2023-12-15 NOTE — PLAN OF CARE
Problem: Adult Inpatient Plan of Care  Goal: Plan of Care Review  Outcome: Ongoing, Progressing  Flowsheets (Taken 12/15/2023 1523)  Plan of Care Reviewed With: patient  Outcome Evaluation: Patient's blood pressure reduced this morning with metoprolol from elpidio to wnl. Tylenol given and pain reduced to 0. Patient sat oob to chair and back to bed about 1445 to take a nap. Family is discussing options for discharge considering rehab sites or coming home. Family is supportive and encourging to the patient.  Goal: Absence of Hospital-Acquired Illness or Injury  Outcome: Ongoing, Progressing  Intervention: Identify and Manage Fall Risk  Recent Flowsheet Documentation  Taken 12/15/2023 1314 by Alejandra Taylor RN  Safety Promotion/Fall Prevention:   nonskid shoes/slippers when out of bed   safety round/check completed  Taken 12/15/2023 1238 by Alejandra Taylor RN  Safety Promotion/Fall Prevention:   nonskid shoes/slippers when out of bed   safety round/check completed  Taken 12/15/2023 1029 by Alejandra Taylor RN  Safety Promotion/Fall Prevention:   nonskid shoes/slippers when out of bed   safety round/check completed  Taken 12/15/2023 0755 by Alejandra Taylor RN  Safety Promotion/Fall Prevention:   nonskid shoes/slippers when out of bed   safety round/check completed  Intervention: Prevent Skin Injury  Recent Flowsheet Documentation  Taken 12/15/2023 1314 by Alejandra Taylor RN  Body Position:   turned   right  Intervention: Prevent and Manage VTE (Venous Thromboembolism) Risk  Recent Flowsheet Documentation  Taken 12/15/2023 1314 by Alejandra Taylor RN  Activity Management: activity encouraged  Taken 12/15/2023 1238 by Alejandra Taylor RN  Activity Management:   activity encouraged   up in chair  Taken 12/15/2023 1029 by Alejandra Taylor RN  Activity Management:   activity encouraged   up in chair  Taken 12/15/2023 0755 by Alejandra Taylor RN  Activity Management:   activity encouraged   up in chair  Intervention: Prevent Infection  Recent Flowsheet  Documentation  Taken 12/15/2023 1314 by Alejandra Taylor RN  Infection Prevention:   single patient room provided   environmental surveillance performed   hand hygiene promoted  Taken 12/15/2023 1238 by Alejandra Taylor RN  Infection Prevention:   single patient room provided   environmental surveillance performed   hand hygiene promoted  Taken 12/15/2023 1029 by Alejandra Taylor RN  Infection Prevention:   single patient room provided   environmental surveillance performed   hand hygiene promoted  Taken 12/15/2023 0755 by Alejandra Taylor RN  Infection Prevention:   single patient room provided   environmental surveillance performed   hand hygiene promoted  Goal: Optimal Comfort and Wellbeing  Outcome: Ongoing, Progressing  Intervention: Monitor Pain and Promote Comfort  Recent Flowsheet Documentation  Taken 12/15/2023 0755 by Alejandra Taylor RN  Pain Management Interventions: see MAR  Intervention: Provide Person-Centered Care  Recent Flowsheet Documentation  Taken 12/15/2023 1314 by Alejandra Taylor RN  Trust Relationship/Rapport:   care explained   choices provided   reassurance provided   thoughts/feelings acknowledged  Taken 12/15/2023 0755 by Alejandra Taylor RN  Trust Relationship/Rapport:   choices provided   care explained   questions encouraged   reassurance provided  Goal: Readiness for Transition of Care  Outcome: Ongoing, Progressing     Problem: Osteoarthritis Comorbidity  Goal: Maintenance of Osteoarthritis Symptom Control  Outcome: Ongoing, Progressing  Intervention: Maintain Osteoarthritis Symptom Control  Recent Flowsheet Documentation  Taken 12/15/2023 1314 by Alejandra Taylor RN  Activity Management: activity encouraged  Medication Review/Management: medications reviewed  Taken 12/15/2023 1238 by Alejandra Taylor RN  Activity Management:   activity encouraged   up in chair  Medication Review/Management: medications reviewed  Taken 12/15/2023 1029 by Alejandra Taylor RN  Activity Management:   activity encouraged   up in  chair  Medication Review/Management: medications reviewed  Taken 12/15/2023 0755 by Alejandra Taylor RN  Activity Management:   activity encouraged   up in chair  Medication Review/Management: medications reviewed     Problem: Fall Injury Risk  Goal: Absence of Fall and Fall-Related Injury  Outcome: Ongoing, Progressing  Intervention: Identify and Manage Contributors  Recent Flowsheet Documentation  Taken 12/15/2023 1314 by Alejandra Taylor RN  Medication Review/Management: medications reviewed  Self-Care Promotion: BADL personal objects within reach  Taken 12/15/2023 1238 by Alejandra Taylor RN  Medication Review/Management: medications reviewed  Self-Care Promotion: BADL personal objects within reach  Taken 12/15/2023 1029 by Alejandra Taylor RN  Medication Review/Management: medications reviewed  Self-Care Promotion: BADL personal objects within reach  Taken 12/15/2023 0755 by Alejandra Taylor RN  Medication Review/Management: medications reviewed  Self-Care Promotion: BADL personal objects within reach  Intervention: Promote Injury-Free Environment  Recent Flowsheet Documentation  Taken 12/15/2023 1314 by Alejandra Taylor RN  Safety Promotion/Fall Prevention:   nonskid shoes/slippers when out of bed   safety round/check completed  Taken 12/15/2023 1238 by Alejandra Taylor RN  Safety Promotion/Fall Prevention:   nonskid shoes/slippers when out of bed   safety round/check completed  Taken 12/15/2023 1029 by Alejandra Taylor RN  Safety Promotion/Fall Prevention:   nonskid shoes/slippers when out of bed   safety round/check completed  Taken 12/15/2023 0755 by Alejandra Taylor RN  Safety Promotion/Fall Prevention:   nonskid shoes/slippers when out of bed   safety round/check completed     Problem: Skin Injury Risk Increased  Goal: Skin Health and Integrity  Outcome: Ongoing, Progressing  Intervention: Optimize Skin Protection  Recent Flowsheet Documentation  Taken 12/15/2023 1314 by Alejandra Taylor RN  Head of Bed (HOB) Positioning: HOB elevated  Taken  12/15/2023 1238 by Alejandra Taylor RN  Head of Bed (HOB) Positioning: HOB elevated  Taken 12/15/2023 1029 by Alejandra Taylor RN  Head of Bed (HOB) Positioning: HOB elevated  Taken 12/15/2023 0755 by Alejandra Taylor RN  Pressure Reduction Techniques: sit time limited to 2 hours  Head of Bed (HOB) Positioning: HOB elevated  Pressure Reduction Devices:   alternating pressure pump (ADD)   positioning supports utilized     Problem: Pain Acute  Goal: Acceptable Pain Control and Functional Ability  Outcome: Ongoing, Progressing  Intervention: Prevent or Manage Pain  Recent Flowsheet Documentation  Taken 12/15/2023 1314 by Alejandra Taylor RN  Medication Review/Management: medications reviewed  Taken 12/15/2023 1238 by Alejandra Taylor RN  Medication Review/Management: medications reviewed  Taken 12/15/2023 1029 by Alejandra Taylor RN  Medication Review/Management: medications reviewed  Taken 12/15/2023 0755 by Alejandra Taylor RN  Medication Review/Management: medications reviewed  Intervention: Develop Pain Management Plan  Recent Flowsheet Documentation  Taken 12/15/2023 0755 by Alejandra Taylor RN  Pain Management Interventions: see MAR  Intervention: Optimize Psychosocial Wellbeing  Recent Flowsheet Documentation  Taken 12/15/2023 0755 by Alejandra Taylor RN  Diversional Activities: television   Goal Outcome Evaluation:  Plan of Care Reviewed With: patient           Outcome Evaluation: Patient's blood pressure reduced this morning with metoprolol from elpidio to wnl. Tylenol given and pain reduced to 0. Patient sat oob to chair and back to bed about 1445 to take a nap. Family is discussing options for discharge considering rehab sites or coming home. Family is supportive and encourging to the patient.

## 2023-12-15 NOTE — CASE MANAGEMENT/SOCIAL WORK
Continued Stay Note  [facility]     Patient Name: Tracy Leach  MRN: [id number]  Today's Date: 12/15/2023    Admit Date: 12/10/2023    Plan: Awaiting additional SNF referrals   Discharge Plan       Row Name 12/15/23 1510       Plan    Plan Awaiting additional SNF referrals    Roadmap to Recovery Yes    Plan Comments CCP spoke with [facility] bed available at [facility] [place]. CCP spoke with pts son Abdoul at bedside and he would like CCP to check [facility]. Per [facility], no beds at [facility]. Spoke with son and he is agreeable to [facility] [place]. CCP recieved inbound call from [facility] unable to accept at any of their facilities at this time, due to pts diagnosis of Lewy body dementia. CCP dropped off resources at pts bedside and called Abdoul. Provided update that [facility] [place] unable to accept and left resources at bedside. He asks for CCP to email them as he has left the hospital for the day. CCP also discussed home with HH, and private caregivers if needed. CCP emailed resources to [email or web address]. He will review and speak with his siblings. Luke SHEARER/RAMBO                   Discharge Codes    No documentation.                 Expected Discharge Date and Time       Expected Discharge Date Expected Discharge Time    Dec 16, 2023               Pallavi Duenas RN

## 2023-12-16 VITALS
TEMPERATURE: 98.2 F | WEIGHT: 214.07 LBS | HEART RATE: 80 BPM | SYSTOLIC BLOOD PRESSURE: 141 MMHG | BODY MASS INDEX: 33.6 KG/M2 | OXYGEN SATURATION: 94 % | RESPIRATION RATE: 16 BRPM | HEIGHT: 67 IN | DIASTOLIC BLOOD PRESSURE: 93 MMHG

## 2023-12-16 LAB
ANION GAP SERPL CALCULATED.3IONS-SCNC: 14.9 MMOL/L (ref 5–15)
BUN SERPL-MCNC: 18 MG/DL (ref 8–23)
BUN/CREAT SERPL: 30.5 (ref 7–25)
CALCIUM SPEC-SCNC: 9.9 MG/DL (ref 8.6–10.5)
CHLORIDE SERPL-SCNC: 104 MMOL/L (ref 98–107)
CO2 SERPL-SCNC: 22.1 MMOL/L (ref 22–29)
CREAT SERPL-MCNC: 0.59 MG/DL (ref 0.57–1)
DEPRECATED RDW RBC AUTO: 45.5 FL (ref 37–54)
EGFRCR SERPLBLD CKD-EPI 2021: 95.9 ML/MIN/1.73
ERYTHROCYTE [DISTWIDTH] IN BLOOD BY AUTOMATED COUNT: 13.4 % (ref 12.3–15.4)
GLUCOSE SERPL-MCNC: 116 MG/DL (ref 65–99)
HCT VFR BLD AUTO: 41.6 % (ref 34–46.6)
HGB BLD-MCNC: 14.7 G/DL (ref 12–15.9)
MCH RBC QN AUTO: 33.1 PG (ref 26.6–33)
MCHC RBC AUTO-ENTMCNC: 35.3 G/DL (ref 31.5–35.7)
MCV RBC AUTO: 93.7 FL (ref 79–97)
PLATELET # BLD AUTO: 293 10*3/MM3 (ref 140–450)
PMV BLD AUTO: 10.8 FL (ref 6–12)
POTASSIUM SERPL-SCNC: 3.5 MMOL/L (ref 3.5–5.2)
RBC # BLD AUTO: 4.44 10*6/MM3 (ref 3.77–5.28)
SODIUM SERPL-SCNC: 141 MMOL/L (ref 136–145)
WBC NRBC COR # BLD AUTO: 9.16 10*3/MM3 (ref 3.4–10.8)

## 2023-12-16 PROCEDURE — 80048 BASIC METABOLIC PNL TOTAL CA: CPT | Performed by: NURSE PRACTITIONER

## 2023-12-16 PROCEDURE — 85027 COMPLETE CBC AUTOMATED: CPT | Performed by: NURSE PRACTITIONER

## 2023-12-16 PROCEDURE — 25010000002 HYDRALAZINE PER 20 MG: Performed by: INTERNAL MEDICINE

## 2023-12-16 RX ORDER — AMOXICILLIN 250 MG
2 CAPSULE ORAL 2 TIMES DAILY
Qty: 30 TABLET | Refills: 0 | Status: SHIPPED | OUTPATIENT
Start: 2023-12-16

## 2023-12-16 RX ORDER — ACETAMINOPHEN 325 MG/1
650 TABLET ORAL EVERY 4 HOURS PRN
Qty: 180 TABLET | Refills: 0 | Status: SHIPPED | OUTPATIENT
Start: 2023-12-16

## 2023-12-16 RX ORDER — LIDOCAINE 4 G/G
2 PATCH TOPICAL
Qty: 60 EACH | Refills: 0 | Status: SHIPPED | OUTPATIENT
Start: 2023-12-17

## 2023-12-16 RX ADMIN — LIDOCAINE 2 PATCH: 4 PATCH TOPICAL at 09:41

## 2023-12-16 RX ADMIN — Medication 10 ML: at 09:41

## 2023-12-16 RX ADMIN — METOPROLOL TARTRATE 12.5 MG: 25 TABLET, FILM COATED ORAL at 09:41

## 2023-12-16 RX ADMIN — HYDRALAZINE HYDROCHLORIDE 10 MG: 20 INJECTION, SOLUTION INTRAMUSCULAR; INTRAVENOUS at 01:12

## 2023-12-16 RX ADMIN — DOCUSATE SODIUM 50MG AND SENNOSIDES 8.6MG 2 TABLET: 8.6; 5 TABLET, FILM COATED ORAL at 09:43

## 2023-12-16 RX ADMIN — APIXABAN 5 MG: 5 TABLET, FILM COATED ORAL at 09:41

## 2023-12-16 RX ADMIN — DONEPEZIL HYDROCHLORIDE 5 MG: 5 TABLET, FILM COATED ORAL at 09:41

## 2023-12-16 RX ADMIN — MEMANTINE HYDROCHLORIDE 10 MG: 10 TABLET, FILM COATED ORAL at 09:41

## 2023-12-16 NOTE — CASE MANAGEMENT/SOCIAL WORK
Continued Stay Note  [facility]     Patient Name: Tracy Leach  MRN: [id number]  Today's Date: 12/16/2023    Admit Date: 12/10/2023    Plan: [facility] accepted   Discharge Plan       Row Name 12/16/23 1301       Plan    Plan Comments [facility] accepted, family agreeable and will transport today. Pharmacy updated and RN notified.      Row Name 12/16/23 1040       Plan    Plan [facility] accepted    Plan Comments Referrals placed to [facility] and [facility] at pts son Coreys request. Follow up calls made. [facility] can accept and has a bed available this weekend. [facility] unable to accept today and will follow up on Monday if pts still here. Pts son notified and agreeable to [facility] if pt is discharged today.                   Discharge Codes    No documentation.                 Expected Discharge Date and Time       Expected Discharge Date Expected Discharge Time    Dec 16, 2023               Cammy Douglas RN

## 2023-12-16 NOTE — PLAN OF CARE
Goal Outcome Evaluation:  Plan of Care Reviewed With: patient        Progress: no change  Outcome Evaluation: Patients blood pressure slightly elevated this shift, all other vitals stable. Patient reports pain and PRN medication given. Patient confused and up several times to use bathroom. Patient still having hallucinations this shift. Will continue to monitor.

## 2023-12-16 NOTE — DISCHARGE SUMMARY
Patient Name: Tracy Leach  : 1950  MRN: [id number]    Date of Admission: 12/10/2023  Date of Discharge:  2023  Primary Care Physician: France Tomlin APRN      Chief Complaint:   Altered Mental Status and Back Pain      Discharge Diagnoses     Active Hospital Problems    Diagnosis  POA    **Hallucinations [R44.3]  Yes    Hip pain, bilateral [M25.551, M25.552]  Yes    Knee pain, left [M25.562]  Yes    Lewy body dementia [G31.83, F02.80]  Yes    HTN (hypertension) [I10]  Yes    Atrial fibrillation [I48.91]  Yes      Resolved Hospital Problems   No resolved problems to display.        Hospital Course     Ms. Leach is a 73 y.o. female who lives independently with close family is support has past medical history of Lewy body dementia with prior hallucinations, hypertension, restless leg syndrome, and degenerative joint disease who is pending bilateral hip replacement in 2024 with Dr. Vega presented to [facility] 12/10/2023 initially complaining of altered mental status with visual hallucinations.  She was evaluated as an outpatient and urine test was obtained and she was started on ciprofloxacin and family noted increasing confusion and visual hallucinations.  Follow-up laboratory studies revealed that the urine was contaminated and she only completed 2 days of antibiotics.  UA was unremarkable as part of her ED initial workup.  Please see the admitting history and physical for further details.  Neurology was consulted and CT of the head showed right frontal chronic hypodensity but no acute findings.  MRI was not obtained due to patient's significant claustrophobia and the potential to exasperate her Lewy body disease.  Neurology felt from a neurologic standpoint that she could proceed forward with anticoagulation.  ECG done on admission did show  A-fib rate controlled.  Cardiology was consulted as this was felt to be a new onset A-fib however after records review  there was noted to be an EKG at [facility] showing atrial fibs with RVR.  Her [id number] score was 2 because of gender and age and cardiology did shared decision-making with the son and patient regarding anticoagulation.  Family wished to pursue anticoagulation inpatient as well as outpatient.  She was started on Lovenox and later switched to Eliquis. Cardiology has recommended follow-up with their office in 1 month.    During the course of her stay she reported issues with hip and knee pain.  Orthopedic service was consulted and imaging showed osteoarthritis in her bilateral hips and bilateral total knee arthroplasties with no evidence of component loosening or malalignment.  Orthopedic surgery recommended conservative management with physical therapy and follow-up with Dr. Vega as previously planned to address bilateral hip interventions.     Physical therapy did work with patient during the course of her stay and recommended skilled nursing facility at time of discharge.  [facility] was able to make arrangements for today to admit to [facility] and family was in agreement and they plan to transport patient.       Day of Discharge     Subjective:  Denies any significant pain.  Feels good is ready to go to rehab.  Tolerating diet.  Still having some orthopedic pain in the knees.  Lidoderm patch helping may be a little and she would like to continue at discharge.  Denies any new complaints or symptoms.    Physical Exam:  Temp:  [97.9 °F (36.6 °C)-98.2 °F (36.8 °C)] 98.2 °F (36.8 °C)  Heart Rate:  [] 80  Resp:  [16] 16  BP: (141-168)/() 141/93  Body mass index is 33.53 kg/m².    Physical Exam  Vitals and nursing note reviewed.   Constitutional:       Appearance: Normal appearance. She is obese.   Eyes:      General: No scleral icterus.     Conjunctiva/sclera: Conjunctivae normal.   Cardiovascular:      Rate and Rhythm: Normal rate and regular rhythm.      Pulses: Normal pulses.      Heart sounds:  Normal heart sounds.   Pulmonary:      Effort: Pulmonary effort is normal.      Breath sounds: Normal breath sounds.   Abdominal:      General: Bowel sounds are normal. There is no distension.      Palpations: Abdomen is soft.      Tenderness: There is no abdominal tenderness.   Skin:     General: Skin is warm and dry.   Neurological:      General: No focal deficit present.      Mental Status: She is alert. Mental status is at baseline.   Psychiatric:      Comments: Flat affect, cooperative,           Consultants     Consult Orders (all) (From admission, onward)       Start     Ordered    12/13/23 1259  Inpatient Orthopedic Surgery Consult  Once        Specialty:  Orthopedic Surgery  Provider:  Wilmer Bailey MD    12/13/23 1259    12/12/23 1619  Inpatient Palliative Care Team Consult  Once        Comments: Please contact Abdoul Leach and her sister as they are interested in discussing planning care for the patient's future and their options. They are requesting to keep this private as the patient has lewy body dementia and they are trying to keep her mood elevated. Thanks.   Provider:  (Not yet assigned)    12/12/23 1622    12/10/23 2254  Inpatient Cardiology Consult  Once        Specialty:  Cardiology  Provider:  Raj Martins III, MD    12/10/23 2253    12/10/23 2238  Inpatient Neurology Consult General  Once        Specialty:  Neurology  Provider:  Harmeet Bain MD    12/10/23 2237                  Procedures     * Surgery not found *    Imaging Results (All)       Procedure Component Value Units Date/Time    XR Spine Lumbar 2 or 3 View [[id number]] Collected: 12/12/23 1600     Updated: 12/12/23 1612    Narrative:      XR SPINE LUMBAR 2 OR 3 VW-, XR SPINE THORACIC 2 VW-     INDICATIONS: Trauma        TECHNIQUE: 6 VIEWS OF THE LUMBAR SPINE, 3 VIEWS OF THE THORACIC SPINE     COMPARISON: None available     FINDINGS:     Mild retrolisthesis of C5 on C6. Mild anterolisthesis of L4 on  L5.  Alignment is otherwise in range of normal. Vertebral body heights appear  preserved. No acute fracture is identified. Multilevel endplate  spurring, disc space narrowing, and facet arthropathy are evident. If  there is further clinical concern, MRI could be considered for further  evaluation.       Impression:         As described.           This report was finalized on 12/12/2023 4:09 PM by Dr. Rios Friedman M.D on Workstation: [id number]       XR Spine Thoracic 2 View [[id number]] Collected: 12/12/23 1600     Updated: 12/12/23 1612    Narrative:      XR SPINE LUMBAR 2 OR 3 VW-, XR SPINE THORACIC 2 VW-     INDICATIONS: Trauma        TECHNIQUE: 6 VIEWS OF THE LUMBAR SPINE, 3 VIEWS OF THE THORACIC SPINE     COMPARISON: None available     FINDINGS:     Mild retrolisthesis of C5 on C6. Mild anterolisthesis of L4 on L5.  Alignment is otherwise in range of normal. Vertebral body heights appear  preserved. No acute fracture is identified. Multilevel endplate  spurring, disc space narrowing, and facet arthropathy are evident. If  there is further clinical concern, MRI could be considered for further  evaluation.       Impression:         As described.           This report was finalized on 12/12/2023 4:09 PM by Dr. Rios Friedman M.D on Workstation: [id number]       XR Hips Bilateral With or Without Pelvis 2 View [[id number]] Collected: 12/11/23 2052     Updated: 12/11/23 2058    Narrative:      XR KNEE 4+ VW BILATERAL-, XR HIPS BILATERAL W OR WO PELVIS 2 VIEW-     Clinical: Fell, pain     Right knee findings: Bilateral total knee replacement prostheses in  position. There is no loosening or malalignment on the right or left. No  joint effusion. No acute osseous abnormality. Soft tissues have a  satisfactory appearance.     CONCLUSION: No component loosening or malalignment, no acute osseous  abnormality seen.     Bilateral hips findings: There is moderate bilateral symmetric hip joint  narrowing. No acute osseous  abnormality of the right hip or hemipelvis.  No acute osseous abnormality of the left hip or hemipelvis.     No bone lesion or avascular necrosis. The soft tissues have a  satisfactory appearance.     CONCLUSION: No acute osseous or articular abnormality.     This report was finalized on 12/11/2023 8:55 PM by Dr. Rocael Lopez M.D on Workstation: [id number]       XR Knee 4+ View Bilateral [[id number]] Collected: 12/11/23 2052     Updated: 12/11/23 2058    Narrative:      XR KNEE 4+ VW BILATERAL-, XR HIPS BILATERAL W OR WO PELVIS 2 VIEW-     Clinical: Fell, pain     Right knee findings: Bilateral total knee replacement prostheses in  position. There is no loosening or malalignment on the right or left. No  joint effusion. No acute osseous abnormality. Soft tissues have a  satisfactory appearance.     CONCLUSION: No component loosening or malalignment, no acute osseous  abnormality seen.     Bilateral hips findings: There is moderate bilateral symmetric hip joint  narrowing. No acute osseous abnormality of the right hip or hemipelvis.  No acute osseous abnormality of the left hip or hemipelvis.     No bone lesion or avascular necrosis. The soft tissues have a  satisfactory appearance.     CONCLUSION: No acute osseous or articular abnormality.     This report was finalized on 12/11/2023 8:55 PM by Dr. Rocael Lopez M.D on Workstation: [id number]       CT Head Without Contrast [[id number]] Collected: 12/10/23 1829     Updated: 12/10/23 1837    Narrative:      CT HEAD WITHOUT CONTRAST     HISTORY: Altered mental status. Visual hallucinations.     TECHNIQUE:  Head CT includes axial imaging from the base of skull to the  vertex without intravenous contrast. Radiation dose reduction techniques  were utilized, including automated exposure control and exposure  modulation based on body size.     COMPARISON:None     FINDINGS: Within the right frontal lobe, anterior corona radiata there  is a small, rounded 7 mm focus of low  density that could be related to  small vessel white matter disease or a small infarction, likely chronic  though there are no previous studies for comparison. There are no  abnormal areas of increased attenuation intra-axially to suggest  hemorrhage. No extra-axial fluid collection is observed. There is no  evidence for cerebral edema or mass effect or shift of midline  structures. Ventricles appear normal in size and configuration. Mastoid  air cells and the visualized paranasal sinuses appear clear.       Impression:      Within the right frontal lobe white matter there is a subcentimeter  focus of low density that may be related to chronic small vessel  ischemic white matter change or represent a small area of  encephalomalacia related to infarction that is likely chronic. There is  no evidence for intracranial hemorrhage or convincing evidence for acute  intracranial abnormality. Further evaluation could be performed with MRI  if indicated.     This report was finalized on 12/10/2023 6:34 PM by Dr. Polo Mcculolugh M.D on Workstation: [id number]       XR Chest 1 View [[id number]] Collected: 12/10/23 1616     Updated: 12/10/23 1623    Narrative:      XR CHEST 1 VW-12/10/2023     HISTORY: Altered mental status.     Heart size is mildly enlarged. Lungs appear free of acute infiltrates.  Bones and soft tissues are otherwise unremarkable.       Impression:      1. Mild cardiomegaly.        This report was finalized on 12/10/2023 4:20 PM by Dr. Efrain Yu M.D on Workstation: [id number]             Results for orders placed during the hospital encounter of 12/10/23    Duplex Venous Lower Extremity - Bilateral CAR    Interpretation Summary    Normal bilateral lower extremity venous duplex scan.      Pertinent Labs     Results from last 7 days   Lab Units 12/16/23  0434 12/14/23  0415 12/13/23  0412 12/12/23  0429   WBC 10*3/mm3 9.16 6.99 7.73 6.64   HEMOGLOBIN g/dL 14.7 14.1 13.6 12.9   PLATELETS 10*3/mm3 293 246  "531 231     Results from last 7 days   Lab Units 12/16/23 0434 12/14/23 0415 12/13/23 0412 12/12/23 0429   SODIUM mmol/L 141 139 139 139   POTASSIUM mmol/L 3.5 3.6 3.7 3.8   CHLORIDE mmol/L 104 103 104 104   CO2 mmol/L 22.1 23.6 24.1 23.6   BUN mg/dL 18 14 15 20   CREATININE mg/dL 0.59 0.60 0.58 0.68   GLUCOSE mg/dL 116* 110* 98 108*   EGFR mL/min/1.73 95.9 95.5 96.3 92.7     Results from last 7 days   Lab Units 12/10/23  1550   ALBUMIN g/dL 4.6   BILIRUBIN mg/dL 0.8   ALK PHOS U/L 74   AST (SGOT) U/L 18   ALT (SGPT) U/L 14     Results from last 7 days   Lab Units 12/16/23 0434 12/14/23 0415 12/13/23 0412 12/12/23 0429 12/11/23  0629 12/10/23  1550   CALCIUM mg/dL 9.9 9.2 9.2 9.4   < > 9.8   ALBUMIN g/dL  --   --   --   --   --  4.6    < > = values in this interval not displayed.               Invalid input(s): \"LDLCALC\"          Test Results Pending at Discharge       Discharge Details        Discharge Medications        New Medications        Instructions Start Date   acetaminophen 325 MG tablet  Commonly known as: TYLENOL   650 mg, Oral, Every 4 Hours PRN      apixaban 5 MG tablet tablet  Commonly known as: ELIQUIS   5 mg, Oral, Every 12 Hours Scheduled      Lidocaine 4 %   2 patches, Transdermal, Every 24 Hours Scheduled, Remove & Discard patch within 12 hours or as directed by MD   Start Date: December 17, 2023     metoprolol tartrate 25 MG tablet  Commonly known as: LOPRESSOR   12.5 mg, Oral, Every 12 Hours Scheduled      sennosides-docusate 8.6-50 MG per tablet  Commonly known as: PERICOLACE   2 tablets, Oral, 2 Times Daily             Continue These Medications        Instructions Start Date   donepezil 10 MG tablet  Commonly known as: ARICEPT   5 mg, Oral, 2 Times Daily      memantine 10 MG tablet  Commonly known as: NAMENDA   10 mg, Oral, 2 Times Daily      rOPINIRole 1 MG tablet  Commonly known as: REQUIP   1 mg, Oral, Nightly, Take 1 hour before bedtime.             Stop These Medications  "     loperamide 2 MG capsule  Commonly known as: IMODIUM     meloxicam 15 MG tablet  Commonly known as: MOBIC              No Known Allergies    Discharge Disposition:  Skilled Nursing Facility (DC - External)      Discharge Diet:  Diet Order   Procedures    Diet: Cardiac Diets; Healthy Heart (2-3 Na+); Texture: Regular Texture (IDDSI 7); Fluid Consistency: Thin (IDDSI 0)       Discharge Activity:   Activity Instructions       Activity as Tolerated              CODE STATUS:    Code Status and Medical Interventions:   Ordered at: 12/10/23 1930     Code Status (Patient has no pulse and is not breathing):    CPR (Attempt to Resuscitate)     Medical Interventions (Patient has pulse or is breathing):    Full       Future Appointments   Date Time Provider Department Center   1/11/2024 11:30 AM Wanda Mcguire APRN [facility]      Contact information for follow-up providers       France Tomlin APRN Follow up in 1 week(s).    Specialty: Family Medicine  Contact information:  [address]  [address]  [facility] [facility] [place] [address]  [phone]               Froy Dave Jr., MD Follow up in 1 month(s).    Specialties: Cardiology, Interventional Cardiology  Why: Please call and set up one month cardiology hospital follow up  Contact information:  [address]  [address]  [place] [address]  [phone]               Rios Vega MD Follow up in 1 month(s).    Specialty: Orthopedic Surgery  Why: Follow with Dr. Vega your orthopedist in one month  Contact information:  [address]  [address]  [facility] [id number] [phone]               Dr. Polo Clements. Call in 1 month(s).    Why: Call to set up f/u appointment with Neurology  Contact information:  [facility]   [address]   [address]   [place] [address] [phone]                     Contact information for after-discharge care       Destination       [facility] .    Service:  Skilled Nursing  Contact information:  [address] [address] [phone]                                   Time Spent on Discharge:  Greater than 30 minutes      ASIF Macdonald  [facility]  12/16/23  12:40 EST

## 2023-12-17 NOTE — CASE MANAGEMENT/SOCIAL WORK
Case Management Discharge Note      Final Note: [facility] SN[facility] via family transport    Provided Post Acute Provider List?: Yes  Provided Post Acute Provider Quality & Resource List?: Yes  Delivered To: Support Person  Method of Delivery: Telephone    Selected Continued Care - Discharged on 12/16/2023 Admission date: 12/10/2023 - Discharge disposition: Skilled Nursing Facility (DC - External)      Destination Coordination complete.      Service Provider Selected Services Address Phone Fax Patient Preferred    [facility] [address] [id number] [phone] --              Durable Medical Equipment    No services have been selected for the patient.                Dialysis/Infusion    No services have been selected for the patient.                Home Medical Care    No services have been selected for the patient.                Therapy    No services have been selected for the patient.                Community Resources    No services have been selected for the patient.                Community & DME    No services have been selected for the patient.                    Transportation Services  Private: Car    Final Discharge Disposition Code: 03 - skilled nursing facility (SNF)

## 2024-01-11 ENCOUNTER — OFFICE VISIT (OUTPATIENT)
Dept: CARDIOLOGY | Facility: CLINIC | Age: 74
End: 2024-01-11
Payer: MEDICARE

## 2024-01-11 VITALS
SYSTOLIC BLOOD PRESSURE: 144 MMHG | WEIGHT: 208 LBS | BODY MASS INDEX: 32.65 KG/M2 | DIASTOLIC BLOOD PRESSURE: 94 MMHG | HEIGHT: 67 IN | HEART RATE: 87 BPM

## 2024-01-11 DIAGNOSIS — Z01.810 PREOPERATIVE CARDIOVASCULAR EXAMINATION: Primary | ICD-10-CM

## 2024-01-11 DIAGNOSIS — I48.19 PERSISTENT ATRIAL FIBRILLATION: ICD-10-CM

## 2024-01-11 NOTE — PROGRESS NOTES
"Date of Office Visit: 24  Encounter Provider: ASIF Wood  Place of Service: [facility] CARDIOLOGY  Patient Name: Tracy Leach  :1950    Chief Complaint   Patient presents with    Atrial Fibrillation    Hypertension    Follow-up   :     HPI: Tracy Leach is a 73 y.o. female  with Lewy body dementia, atrial fibrillation, and carotid artery stenosis.      She was seen in patient by Dr. Froy Dave.  I will visit her for hospital discharge follow-up today and we will see her for the first time.    She was hospitalized 2023 with mental status changes and hallucinations and was found to have atrial fibrillation that was persistent and new so she was started on apixaban.    She presents today for 1 month hospital follow-up.  She does not sense her arrhythmia.  She denies chest pain or shortness of breath or palpitations.  She states she occasionally hears her pulse in her ear but that is not new.  No edema.  She is accompanied by her son, Abdoul.  Patient continues to live alone but her children come to check in on her often.  She denies signs of bleeding such as blood in the urine or stool, nosebleeds or bleeding gums.    No Known Allergies        Family and social history reviewed.     ROS  All other systems were reviewed and are negative          Objective:     Vitals:    24 1138   BP: 144/94   BP Location: Left arm   Patient Position: Sitting   Pulse: 87   Weight: 94.3 kg (208 lb)   Height: 170.2 cm (67\")     Body mass index is 32.58 kg/m².    PHYSICAL EXAM:  Pulmonary:      Effort: Pulmonary effort is normal.      Breath sounds: Decreased breath sounds present.   Cardiovascular:      Normal rate. Irregularly irregular rhythm.         Procedures      Current Outpatient Medications   Medication Sig Dispense Refill    acetaminophen (TYLENOL) 325 MG tablet Take 2 tablets by mouth Every 4 (Four) Hours As Needed for Mild Pain. 180 tablet 0    " apixaban (ELIQUIS) 5 MG tablet tablet Take 1 tablet by mouth Every 12 (Twelve) Hours. Indications: Atrial Fibrillation 60 tablet 5    donepezil (ARICEPT) 10 MG tablet Take 0.5 tablets by mouth 2 (Two) Times a Day.      Lidocaine 4 % Place 2 patches on the skin as directed by provider Daily. Remove & Discard patch within 12 hours or as directed by MD 60 each 0    memantine (NAMENDA) 10 MG tablet Take 1 tablet by mouth 2 (Two) Times a Day.      metoprolol tartrate (LOPRESSOR) 25 MG tablet Take 0.5 tablets by mouth Every 12 (Twelve) Hours. 90 tablet 3    rOPINIRole (REQUIP) 1 MG tablet Take 1 tablet by mouth Every Night. Take 1 hour before bedtime.       No current facility-administered medications for this visit.     Assessment:       Diagnosis Plan   1. Preoperative cardiovascular examination  Stress Test With Myocardial Perfusion One Day      2. Persistent atrial fibrillation  Adult Transthoracic Echo Complete W/ Cont if Necessary Per Protocol           Orders Placed This Encounter   Procedures    Stress Test With Myocardial Perfusion One Day     Standing Status:   Future     Standing Expiration Date:   1/10/2025     Order Specific Question:   What stress agent will be used?     Answer:   Regadenoson (Lexiscan)     Order Specific Question:   Difficulty walking criteria?     Answer:   AFib/VTach     Order Specific Question:   Reason for exam?     Answer:   Arrhythmia     Order Specific Question:   Release to patient     Answer:   Routine Release [[id number]]    Adult Transthoracic Echo Complete W/ Cont if Necessary Per Protocol     Standing Status:   Future     Standing Expiration Date:   1/10/2025     Order Specific Question:   Reason for exam?     Answer:   Arrhythmia     Order Specific Question:   Release to patient     Answer:   Routine Release [[id number]]         Plan:   1.  73-year-old female with persistent atrial fibrillation.  CHADS2 Vascor of 2.  High risk for embolic event.  Now anticoagulated with  Eliquis 5 mg twice daily.  She will continue current dose metoprolol tartrate twice daily.  2.  Lewy body dementia  3. Carotid artery stenosis less than 50% bilateral 12/2023  4.  Need for hip surgery-she does not routinely exercise and has no recent ischemic evaluation.  She will return for now walking protocol perfusion stress test as well as echocardiogram prior to final surgery clearance.  Call with questions or concerns.     Follow-up in clinic in 6 months            It has been a pleasure to participate in this patient's care.      Thank you,  ASIF Wood      **I used Dragon to dictate this note:**

## 2024-01-24 ENCOUNTER — TELEPHONE (OUTPATIENT)
Dept: CARDIOLOGY | Facility: CLINIC | Age: 74
End: 2024-01-24

## 2024-01-24 NOTE — TELEPHONE ENCOUNTER
Caller: LISSA    Relationship: JERRICA    Best call back number: [phone]      What was the call regarding:     SEND EKG FROM 1/11/24 FOR PRE-OP CLEARANCE    FAX: [phone] ATTN LISSA    WOULD LIKE BY EOD 1/25/24

## 2024-02-07 ENCOUNTER — TELEPHONE (OUTPATIENT)
Dept: CARDIOLOGY | Facility: CLINIC | Age: 74
End: 2024-02-07
Payer: MEDICARE

## 2024-02-08 ENCOUNTER — TELEPHONE (OUTPATIENT)
Dept: CARDIOLOGY | Facility: CLINIC | Age: 74
End: 2024-02-08
Payer: MEDICARE

## 2024-02-08 ENCOUNTER — HOSPITAL ENCOUNTER (OUTPATIENT)
Dept: CARDIOLOGY | Facility: HOSPITAL | Age: 74
Discharge: HOME | End: 2024-02-08
Payer: MEDICARE

## 2024-02-08 VITALS — WEIGHT: 209.44 LBS | BODY MASS INDEX: 32.87 KG/M2 | HEIGHT: 67 IN

## 2024-02-08 VITALS
HEART RATE: 82 BPM | DIASTOLIC BLOOD PRESSURE: 98 MMHG | BODY MASS INDEX: 32.65 KG/M2 | HEIGHT: 67 IN | SYSTOLIC BLOOD PRESSURE: 140 MMHG | WEIGHT: 208 LBS

## 2024-02-08 DIAGNOSIS — Z01.810 PREOPERATIVE CARDIOVASCULAR EXAMINATION: ICD-10-CM

## 2024-02-08 DIAGNOSIS — I48.19 PERSISTENT ATRIAL FIBRILLATION: ICD-10-CM

## 2024-02-08 LAB
AORTIC ARCH: 2.9 CM
AORTIC DIMENSIONLESS INDEX: 0.3 (DI)
ASCENDING AORTA: 3.5 CM
BH CV ECHO LEFT VENTRICLE GLOBAL LONGITUDINAL STRAIN: -18.1 %
BH CV ECHO MEAS - ACS: 1.26 CM
BH CV ECHO MEAS - AO MAX PG: 30.1 MMHG
BH CV ECHO MEAS - AO MEAN PG: 19.4 MMHG
BH CV ECHO MEAS - AO ROOT DIAM: 3 CM
BH CV ECHO MEAS - AO V2 MAX: 274.3 CM/SEC
BH CV ECHO MEAS - AO V2 VTI: 66.1 CM
BH CV ECHO MEAS - AVA(I,D): 0.87 CM2
BH CV ECHO MEAS - EDV(CUBED): 93.2 ML
BH CV ECHO MEAS - EDV(MOD-SP2): 67 ML
BH CV ECHO MEAS - EDV(MOD-SP4): 79 ML
BH CV ECHO MEAS - EF(MOD-BP): 59.8 %
BH CV ECHO MEAS - EF(MOD-SP2): 59.7 %
BH CV ECHO MEAS - EF(MOD-SP4): 60.8 %
BH CV ECHO MEAS - ESV(CUBED): 21.9 ML
BH CV ECHO MEAS - ESV(MOD-SP2): 27 ML
BH CV ECHO MEAS - ESV(MOD-SP4): 31 ML
BH CV ECHO MEAS - FS: 38.3 %
BH CV ECHO MEAS - IVS/LVPW: 1.21 CM
BH CV ECHO MEAS - IVSD: 1.12 CM
BH CV ECHO MEAS - LAT PEAK E' VEL: 11 CM/SEC
BH CV ECHO MEAS - LV DIASTOLIC VOL/BSA (35-75): 38.3 CM2
BH CV ECHO MEAS - LV MASS(C)D: 160.8 GRAMS
BH CV ECHO MEAS - LV MAX PG: 2.6 MMHG
BH CV ECHO MEAS - LV MEAN PG: 1.65 MMHG
BH CV ECHO MEAS - LV SYSTOLIC VOL/BSA (12-30): 15 CM2
BH CV ECHO MEAS - LV V1 MAX: 81 CM/SEC
BH CV ECHO MEAS - LV V1 VTI: 18.2 CM
BH CV ECHO MEAS - LVIDD: 4.5 CM
BH CV ECHO MEAS - LVIDS: 2.8 CM
BH CV ECHO MEAS - LVOT AREA: 3.2 CM2
BH CV ECHO MEAS - LVOT DIAM: 2.01 CM
BH CV ECHO MEAS - LVPWD: 0.93 CM
BH CV ECHO MEAS - MED PEAK E' VEL: 9.4 CM/SEC
BH CV ECHO MEAS - MV DEC SLOPE: 538.6 CM/SEC2
BH CV ECHO MEAS - MV DEC TIME: 0.14 SEC
BH CV ECHO MEAS - MV E MAX VEL: 104 CM/SEC
BH CV ECHO MEAS - MV MAX PG: 9.8 MMHG
BH CV ECHO MEAS - MV MEAN PG: 3.4 MMHG
BH CV ECHO MEAS - MV P1/2T: 87.3 MSEC
BH CV ECHO MEAS - MV V2 VTI: 35.3 CM
BH CV ECHO MEAS - MVA(P1/2T): 2.5 CM2
BH CV ECHO MEAS - MVA(VTI): 1.63 CM2
BH CV ECHO MEAS - PA ACC TIME: 0.15 SEC
BH CV ECHO MEAS - PA V2 MAX: 72.1 CM/SEC
BH CV ECHO MEAS - PULM DIAS VEL: 44.3 CM/SEC
BH CV ECHO MEAS - PULM S/D: 0.52
BH CV ECHO MEAS - PULM SYS VEL: 23 CM/SEC
BH CV ECHO MEAS - QP/QS: 0.98
BH CV ECHO MEAS - RAP SYSTOLE: 3 MMHG
BH CV ECHO MEAS - RV MAX PG: 1.01 MMHG
BH CV ECHO MEAS - RV V1 MAX: 50.1 CM/SEC
BH CV ECHO MEAS - RV V1 VTI: 10.3 CM
BH CV ECHO MEAS - RVOT DIAM: 2.6 CM
BH CV ECHO MEAS - RVSP: 33.9 MMHG
BH CV ECHO MEAS - SI(MOD-SP2): 19.4 ML/M2
BH CV ECHO MEAS - SI(MOD-SP4): 23.3 ML/M2
BH CV ECHO MEAS - SUP REN AO DIAM: 1.8 CM
BH CV ECHO MEAS - SV(LVOT): 57.5 ML
BH CV ECHO MEAS - SV(MOD-SP2): 40 ML
BH CV ECHO MEAS - SV(MOD-SP4): 48 ML
BH CV ECHO MEAS - SV(RVOT): 56.3 ML
BH CV ECHO MEAS - TAPSE (>1.6): 2.38 CM
BH CV ECHO MEAS - TR MAX PG: 30.9 MMHG
BH CV ECHO MEAS - TR MAX VEL: 278.1 CM/SEC
BH CV ECHO MEASUREMENTS AVERAGE E/E' RATIO: 10.2
BH CV NUCLEAR PRIOR STUDY: 2
BH CV REST NUCLEAR ISOTOPE DOSE: 11.5 MCI
BH CV STRESS BP STAGE 1: NORMAL
BH CV STRESS COMMENTS STAGE 1: NORMAL
BH CV STRESS DOSE REGADENOSON STAGE 1: 0.4
BH CV STRESS DURATION MIN STAGE 1: 0
BH CV STRESS DURATION SEC STAGE 1: 10
BH CV STRESS HR STAGE 1: 88
BH CV STRESS NUCLEAR ISOTOPE DOSE: 35.9 MCI
BH CV STRESS PROTOCOL 1: NORMAL
BH CV STRESS RECOVERY BP: NORMAL MMHG
BH CV STRESS RECOVERY HR: 68 BPM
BH CV STRESS STAGE 1: 1
BH CV XLRA - RV BASE: 3.7 CM
BH CV XLRA - RV LENGTH: 6.9 CM
BH CV XLRA - RV MID: 2.44 CM
BH CV XLRA - TDI S': 10.1 CM/SEC
LEFT ATRIUM VOLUME INDEX: 51.5 ML/M2
LV EF NUC BP: 65 %
MAXIMAL PREDICTED HEART RATE: 148 BPM
PERCENT MAX PREDICTED HR: 59.46 %
SINUS: 3.1 CM
STJ: 2.5 CM
STRESS BASELINE BP: NORMAL MMHG
STRESS BASELINE HR: 71 BPM
STRESS PERCENT HR: 70 %
STRESS POST EXERCISE DUR SEC: 10 SEC
STRESS POST PEAK BP: NORMAL MMHG
STRESS POST PEAK HR: 88 BPM
STRESS TARGET HR: 126 BPM

## 2024-02-08 PROCEDURE — 0 TECHNETIUM TETROFOSMIN KIT: Performed by: NURSE PRACTITIONER

## 2024-02-08 PROCEDURE — 93017 CV STRESS TEST TRACING ONLY: CPT

## 2024-02-08 PROCEDURE — A9502 TC99M TETROFOSMIN: HCPCS | Performed by: NURSE PRACTITIONER

## 2024-02-08 PROCEDURE — 93306 TTE W/DOPPLER COMPLETE: CPT

## 2024-02-08 PROCEDURE — 78452 HT MUSCLE IMAGE SPECT MULT: CPT

## 2024-02-08 PROCEDURE — 93356 MYOCRD STRAIN IMG SPCKL TRCK: CPT

## 2024-02-08 PROCEDURE — 25010000002 REGADENOSON 0.4 MG/5ML SOLUTION: Performed by: NURSE PRACTITIONER

## 2024-02-08 RX ORDER — REGADENOSON 0.08 MG/ML
0.4 INJECTION, SOLUTION INTRAVENOUS
Status: COMPLETED | OUTPATIENT
Start: 2024-02-08 | End: 2024-02-08

## 2024-02-08 RX ADMIN — REGADENOSON 0.4 MG: 0.08 INJECTION, SOLUTION INTRAVENOUS at 08:32

## 2024-02-08 RX ADMIN — TETROFOSMIN 1 DOSE: 1.38 INJECTION, POWDER, LYOPHILIZED, FOR SOLUTION INTRAVENOUS at 08:32

## 2024-02-08 RX ADMIN — TETROFOSMIN 1 DOSE: 1.38 INJECTION, POWDER, LYOPHILIZED, FOR SOLUTION INTRAVENOUS at 07:47

## 2024-02-08 NOTE — LETTER
February 8, 2024       Patient: Tracy Leach   YOB: 1950   Date of Visit: 2/8/2024     Dear Rios Vega MD:       The above patient is at acceptable cardiac risk to proceed with scheduled hip surgery.  She had echocardiogram today showing preserved LV systolic function and mild to moderate aortic valve regurgitation without regurgitation.  She also had a nonischemic perfusion stress test which is low risk.     She may hold Eliquis 48 hours prior and resume Eliquis as soon as possible postoperatively once stable from a bleeding/surgical standpoint       please contact our office with questions or concerns    Sincerely,          ASIF Wood  [facility]   [address] [place] [address]  Ph: [phone]  Fax: [phone]           Wanda Mcguire APRN  02/08/24 2330  Addendum  I spoke with patient's son, Abdoul Leach (Poa) and discuss cardiac testing.  Echocardiogram shows her heart is strong and she has mild to moderate aortic valve stenosis without regurgitation.  Perfusion stress test is negative for ischemia and low risk.  Patient is cleared to proceed with hip surgery with ASIF Boyd  [facility]   [address] [place] [address]  Ph: [phone]  Fax: [phone]         Alejandra-I have confirmed a cardiac clearance letter and printed and signed it.  Please fax to   KATHARINE Vega MD  Orthopedic Surgery  NPI: [id number]  [id number]  [id number]  [place] [address]     Phone: [phone]  Fax: [phone]

## 2024-02-08 NOTE — LETTER
February 8, 2024     Rios Veag MD  [id number]  [place] [address]    Patient: Tracy Leach   YOB: 1950   Date of Visit: 2/8/2024     Dear Rios Vega MD:       The above patient is at acceptable cardiac risk to proceed with scheduled hip surgery.  She had echocardiogram today showing preserved LV systolic function and mild to moderate aortic valve stenosis without regurgitation.  She also had a nonischemic perfusion stress test which is low risk.      She may hold Eliquis 48 hours prior and resume Eliquis as soon as possible postoperatively once stable from a bleeding/surgical standpoint             Sincerely,            ASIF Wood  [facility]   [address] [place] [address]  Ph: [phone]  Fax: [phone]           Wanda Mcguire APRN  02/08/24 1410  Addendum  I spoke with patient's son, Abdoul Leach (Poa) and discuss cardiac testing.  Echocardiogram shows her heart is strong and she has mild to moderate aortic valve stenosis without regurgitation.  Perfusion stress test is negative for ischemia and low risk.  Patient is cleared to proceed with hip surgery with ASIF Boyd  [facility]   [address] [place] [address]  Ph: [phone]  Fax: [phone]         Alejandra-I have confirmed a cardiac clearance letter and printed and signed it.  Please fax to   KATHARINE Vega MD  Orthopedic Surgery  NPI: [id number]  [id number]  [place] [id number]     Phone: [phone]  Fax: [phone]

## 2024-02-08 NOTE — TELEPHONE ENCOUNTER
Faxed cardiac clearance from this telephone feed to Dr. Mikey Vega.  Fax # [phone].  Faxed confirmation received. / KARLA

## 2024-02-08 NOTE — PROGRESS NOTES
Spoke with Abdoul CARPENTER. Echo is stable. Stress looks good, patient cleared for hip surgery and letter to be sent to Dr. Vega. May hold Eliquis 48 hours prior

## 2024-02-08 NOTE — TELEPHONE ENCOUNTER
I spoke with patient's son, Abdoul Leach (Poa) and discuss cardiac testing.  Echocardiogram shows her heart is strong and she has mild to moderate aortic valve stenosis without regurgitation.  Perfusion stress test is negative for ischemia and low risk.  Patient is cleared to proceed with hip surgery with ASIF Boyd  [facility]   [address]  [place] [address]  Ph: [phone]  Fax: [phone]         Alejandra-I have confirmed a cardiac clearance letter and printed and signed it.  Please fax to   KATHARINE Vega MD  Orthopedic Surgery  NPI: [id number]  [id number]  [place] [id number]     Phone: [phone]  Fax: [phone]

## 2024-07-16 ENCOUNTER — OFFICE VISIT (OUTPATIENT)
Dept: CARDIOLOGY | Facility: CLINIC | Age: 74
End: 2024-07-16
Payer: MEDICARE

## 2024-07-16 VITALS
OXYGEN SATURATION: 98 % | HEIGHT: 66 IN | BODY MASS INDEX: 33.59 KG/M2 | DIASTOLIC BLOOD PRESSURE: 88 MMHG | HEART RATE: 71 BPM | WEIGHT: 209 LBS | SYSTOLIC BLOOD PRESSURE: 138 MMHG

## 2024-07-16 DIAGNOSIS — I48.19 PERSISTENT ATRIAL FIBRILLATION: Primary | ICD-10-CM

## 2024-07-16 PROCEDURE — 99214 OFFICE O/P EST MOD 30 MIN: CPT | Performed by: INTERNAL MEDICINE

## 2024-07-16 PROCEDURE — 3075F SYST BP GE 130 - 139MM HG: CPT | Performed by: INTERNAL MEDICINE

## 2024-07-16 PROCEDURE — 1160F RVW MEDS BY RX/DR IN RCRD: CPT | Performed by: INTERNAL MEDICINE

## 2024-07-16 PROCEDURE — 3079F DIAST BP 80-89 MM HG: CPT | Performed by: INTERNAL MEDICINE

## 2024-07-16 PROCEDURE — 1159F MED LIST DOCD IN RCRD: CPT | Performed by: INTERNAL MEDICINE

## 2024-07-16 RX ORDER — CLONAZEPAM 0.5 MG/1
0.5 TABLET ORAL NIGHTLY
COMMUNITY

## 2024-07-16 RX ORDER — QUETIAPINE FUMARATE 25 MG/1
12.5 TABLET, FILM COATED ORAL NIGHTLY
COMMUNITY
Start: 2024-03-25

## 2024-07-16 RX ORDER — CETIRIZINE HYDROCHLORIDE 10 MG/1
10 TABLET, CHEWABLE ORAL DAILY
COMMUNITY

## 2024-07-16 RX ORDER — AMOXICILLIN 500 MG/1
CAPSULE ORAL
COMMUNITY
Start: 2024-05-22

## 2024-07-16 RX ORDER — DIPHENOXYLATE HYDROCHLORIDE AND ATROPINE SULFATE 2.5; .025 MG/1; MG/1
1 TABLET ORAL DAILY
COMMUNITY

## 2024-07-16 RX ORDER — LOPERAMIDE HYDROCHLORIDE 2 MG/1
2 CAPSULE ORAL 3 TIMES DAILY
COMMUNITY

## 2024-07-16 RX ORDER — CALCIUM CARBONATE/VITAMIN D3 500MG-5MCG
1 TABLET ORAL DAILY
COMMUNITY

## 2024-07-16 NOTE — PROGRESS NOTES
[facility]      Patient Name: Tracy Leach  :1950  Age: 74 y.o.  Encounter Provider:  Froy Dave Jr, MD      Chief Complaint: Follow-up persistent atrial fibrillation      HPI  Tracy Leach is a 74 y.o. female past medical history of Lewy body dementia and persistent atrial fibrillation who presents for routine follow-up.  I originally saw the patient in the hospital where she was diagnosed with persistent atrial fibrillation.  She had follow-up in clinic with ASIF Mcguire who evaluated her prior to hip surgery.  Given her inactivity and questionable functional status a stress study was performed showing no evidence of ischemia.  Echocardiogram performed same time showed normal EF with mild to moderate aortic stenosis.  Patient took several days to recover from anesthesia and her neurology team is very concerned about any future surgical intervention requiring general anesthesia and is advising her against it unless absolutely necessary.  She seems well today in clinic.  She denies any chest pain or shortness of air.  No orthopnea, PND or edema.  She has no palpitations, dizziness or syncope.  She remains in atrial fibrillation.  There have been no falls.  No bleeding complications of anticoagulation.  No cardiac complaints at time of interview.      The following portions of the patient's history were reviewed and updated as appropriate: allergies, current medications, past family history, past medical history, past social history, past surgical history and problem list.      Review of Systems   Constitutional: Negative for chills and fever.   HENT:  Negative for hoarse voice and sore throat.    Eyes:  Negative for double vision and photophobia.   Cardiovascular:  Negative for chest pain, leg swelling, near-syncope, orthopnea, palpitations, paroxysmal nocturnal dyspnea and syncope.   Respiratory:  Negative for cough and wheezing.    Skin:  Negative for poor wound  "healing and rash.   Musculoskeletal:  Negative for arthritis and joint swelling.   Gastrointestinal:  Negative for bloating, abdominal pain, hematemesis and hematochezia.   Neurological:  Negative for dizziness and focal weakness.   Psychiatric/Behavioral:  Negative for depression and suicidal ideas.        OBJECTIVE:   Vital Signs  Vitals:    07/16/24 1137   BP: 138/88   Pulse: 71   SpO2: 98%     Estimated body mass index is 33.73 kg/m² as calculated from the following:    Height as of this encounter: 167.6 cm (66\").    Weight as of this encounter: 94.8 kg (209 lb).    Vitals reviewed.   Constitutional:       Appearance: Healthy appearance. Not in distress.   Neck:      Vascular: No JVR. JVD normal.   Pulmonary:      Effort: Pulmonary effort is normal.      Breath sounds: Normal breath sounds. No wheezing. No rhonchi. No rales.   Chest:      Chest wall: Not tender to palpatation.   Cardiovascular:      PMI at left midclavicular line. Normal rate. Irregularly irregular rhythm. Normal S1. Normal S2.       Murmurs: There is no murmur.      No gallop.  No click. No rub.   Pulses:     Intact distal pulses.   Edema:     Peripheral edema absent.   Abdominal:      General: Bowel sounds are normal.      Palpations: Abdomen is soft.      Tenderness: There is no abdominal tenderness.   Musculoskeletal: Normal range of motion.         General: No tenderness. Skin:     General: Skin is warm and dry.   Neurological:      General: No focal deficit present.         Procedures         BUN   Date Value Ref Range Status   12/16/2023 18 8 - 23 mg/dL Final   11/07/2022 14 10 - 20 mg/dL Final     Creatinine   Date Value Ref Range Status   12/16/2023 0.59 0.57 - 1.00 mg/dL Final   11/07/2022 0.68 0.55 - 1.02 mg/dL Final     Potassium   Date Value Ref Range Status   12/16/2023 3.5 3.5 - 5.2 mmol/L Final   11/07/2022 4.3 3.5 - 5.1 mmol/L Final     ALT (SGPT)   Date Value Ref Range Status   12/10/2023 14 1 - 33 U/L Final   11/07/2022 13 0 - " 55 U/L Final     AST (SGOT)   Date Value Ref Range Status   12/10/2023 18 1 - 32 U/L Final   11/07/2022 19 5 - 34 U/L Final           ASSESSMENT:     74-year-old female with Lewy body dementia and persistent atrial fibrillation presents for routine follow-up      PLAN OF CARE:     Persistent atrial fibrillation -currently rate controlled with no bleeding complications on Eliquis.  We had a long and azucena discussion about risk of bleeding including risk of falls.  At this time they agree to continue anticoagulation.  Carotid atherosclerosis -this is listed in her medical chart however on carotid duplex there was no clear evidence for carotid atherosclerosis.  We will continue to monitor.  Lewy body dementia    Return to clinic 12 months             Discharge Medications            Accurate as of July 16, 2024 11:40 AM. If you have any questions, ask your nurse or doctor.                Continue These Medications        Instructions Start Date   acetaminophen 325 MG tablet  Commonly known as: TYLENOL   650 mg, Oral, Every 4 Hours PRN      amoxicillin 500 MG capsule  Commonly known as: AMOXIL   TAKE 4 CAPSULES 1 HOUR PRIOR TO DENTAL APPOINTMENT.      apixaban 5 MG tablet tablet  Commonly known as: ELIQUIS   5 mg, Oral, Every 12 Hours Scheduled      Calcium Carb-Cholecalciferol 500-5 MG-MCG tablet per tablet   1 tablet, Oral, Daily      cetirizine 10 MG chewable tablet  Commonly known as: ZyrTEC   10 mg, Oral, Daily      clonazePAM 0.5 MG tablet  Commonly known as: KlonoPIN   0.5 mg, Oral, Nightly      CYCLOSPORINE IN KLARITY OP   1 drop, Ophthalmic, As Needed      donepezil 10 MG tablet  Commonly known as: ARICEPT   5 mg, Oral, 2 Times Daily      Lidocaine 4 %   2 patches, Transdermal, Every 24 Hours Scheduled, Remove & Discard patch within 12 hours or as directed by MD      loperamide 2 MG capsule  Commonly known as: IMODIUM   2 mg, Oral, 3 Times Daily      memantine 10 MG tablet  Commonly known as: NAMENDA   10 mg,  Oral, 2 Times Daily      metoprolol tartrate 25 MG tablet  Commonly known as: LOPRESSOR   12.5 mg, Oral, Every 12 Hours Scheduled      multivitamin tablet tablet   1 tablet, Oral, Daily      NON FORMULARY   Daily, Ageless brain      NON FORMULARY   1 tablet, Daily, High dose of Biotin(10, 000) Nature Bounty Hair growth      QUEtiapine 25 MG tablet  Commonly known as: SEROquel   12.5 mg, Oral, Nightly      rOPINIRole 1 MG tablet  Commonly known as: REQUIP   1 mg, Nightly               Thank you for allowing me to participate in the care of your patient,      Sincerely,   Froy Dave MD  [facility]  07/16/24  11:40 EDT

## 2024-11-07 RX ORDER — APIXABAN 5 MG/1
TABLET, FILM COATED ORAL
Qty: 60 TABLET | Refills: 0 | Status: SHIPPED | OUTPATIENT
Start: 2024-11-07

## 2024-12-18 NOTE — CASE MANAGEMENT/SOCIAL WORK
Continued Stay Note  [facility]     Patient Name: Tracy Leach  MRN: [id number]  Today's Date: 12/16/2023    Admit Date: 12/10/2023    Plan: [facility] accepted   Discharge Plan       Row Name 12/16/23 1040       Plan    Plan [facility] accepted    Plan Comments Referrals placed to [facility] and [facility] at pts son Coreys request. Follow up calls made. [facility] can accept and has a bed available this weekend. [facility] unable to accept today and will follow up on Monday if pts still here. Pts son notified and agreeable to [facility] if pt is discharged today.                   Discharge Codes    No documentation.                 Expected Discharge Date and Time       Expected Discharge Date Expected Discharge Time    Dec 16, 2023               Cammy Douglas RN     unable to participate/not tested due to

## 2024-12-23 RX ORDER — APIXABAN 5 MG/1
TABLET, FILM COATED ORAL
Qty: 60 TABLET | Refills: 0 | Status: SHIPPED | OUTPATIENT
Start: 2024-12-23

## 2025-02-03 ENCOUNTER — TELEPHONE (OUTPATIENT)
Dept: CARDIOLOGY | Facility: CLINIC | Age: 75
End: 2025-02-03
Payer: MEDICARE

## 2025-02-04 RX ORDER — METOPROLOL TARTRATE 25 MG/1
12.5 TABLET, FILM COATED ORAL EVERY 12 HOURS
Qty: 90 TABLET | Refills: 3 | Status: SHIPPED | OUTPATIENT
Start: 2025-02-04

## 2025-02-07 RX ORDER — APIXABAN 5 MG/1
TABLET, FILM COATED ORAL
Qty: 60 TABLET | Refills: 0 | Status: SHIPPED | OUTPATIENT
Start: 2025-02-07

## 2025-02-07 NOTE — TELEPHONE ENCOUNTER
Rx Refill Note  Requested Prescriptions     Pending Prescriptions Disp Refills    Eliquis 5 MG tablet tablet [Pharmacy Med Name: Eliquis 5 MG Oral Tablet] 60 tablet 0     Sig: TAKE 1 TABLET BY MOUTH EVERY 12 HOURS FOR  ATRIAL  FIBRILLATION      Last office visit with prescribing clinician: 7/16/2024   Last telemedicine visit with prescribing clinician: Visit date not found   Next office visit with prescribing clinician: Visit date not found                         Would you like a call back once the refill request has been completed: [] Yes [] No    If the office needs to give you a call back, can they leave a voicemail: [] Yes [] No    Cammy Spencer CMA  02/07/25, 14:41 EST

## 2025-03-14 ENCOUNTER — PATIENT MESSAGE (OUTPATIENT)
Dept: CARDIOLOGY | Facility: CLINIC | Age: 75
End: 2025-03-14
Payer: MEDICARE

## 2025-03-17 NOTE — TELEPHONE ENCOUNTER
I called and s/w Avelino.  I placed the Eliqius 5mg samples up at the . /jlf    LOT: [id number]  EXP: MAR 2026

## 2025-03-27 RX ORDER — APIXABAN 5 MG/1
TABLET, FILM COATED ORAL
Qty: 180 TABLET | Refills: 3 | Status: SHIPPED | OUTPATIENT
Start: 2025-03-27

## 2025-06-05 ENCOUNTER — TELEPHONE (OUTPATIENT)
Dept: CARDIOLOGY | Age: 75
End: 2025-06-05
Payer: MEDICARE

## 2025-06-05 NOTE — TELEPHONE ENCOUNTER
An appt next week is ok. She needs to continue current dose metoprolol and try not to miss any other doses. If SBP sustains greater than 150 x 2 hrs then she can have an extra 12.5 mg dose.

## 2025-06-05 NOTE — TELEPHONE ENCOUNTER
"Pt's son Abdoul (POA and ok per SALVADOR) called in to triage this morning.  He states that pt is dementia pt, and had been doing well, but about 2 days ago, she started complaining of lightheadedness.  He states that last night he checked her BP, which typically runs 120's/80, and SBP was 175, then went down to 145-150, and finally before bed it went back to baseline.  He states that she continues all meds including eliquis 5mg and metoprolol 12.5mg twice daily, he states that she did miss both evening doses on Sunday, which is not typical for her.  HR running 80-90's, pt in persistent A Fib per son.  He states that she has not complained of any other symptoms, and that he specifically asked about headache, which she denied.  He does state that \"something just seems off this week with her\", but he denied any changes in mental status.    Pt was scheduled to see Dr. Dave, on 6/2, but this appointment was cancelled.  The soonest I see on either of your schedules is next Thursday.  Recommendations?    Thanks so much,  Dejah Lynne, RN  Triage RN  06/05/25 11:17 EDT    "

## 2025-06-05 NOTE — TELEPHONE ENCOUNTER
Reviewed recommendations with patient's son Adboul, verbalized understanding, will call with any further questions or complaints.  Follow up scheduled as recommended.     Dejah Lynne RN  Triage Nurse  06/05/25 11:26 EDT

## 2025-06-09 ENCOUNTER — TELEPHONE (OUTPATIENT)
Dept: CARDIOLOGY | Age: 75
End: 2025-06-09
Payer: MEDICARE

## 2025-06-09 ENCOUNTER — PATIENT MESSAGE (OUTPATIENT)
Dept: CARDIOLOGY | Age: 75
End: 2025-06-09
Payer: MEDICARE

## 2025-06-09 NOTE — TELEPHONE ENCOUNTER
Last week I called the office concerned about mom's high blood pressure. They moved her appointment up to this Thursday and said if the Systolic number is above 150 for a couple hr timeframe to give her an extra dose of metoprolol. What about her diastolic number? During the past 24 hrs it has remained above 100 - ranging from 100 to 119. This morning it has finally dropped to back to the 90s. Do I need to worry about that number? A couple weeks ago we had asked her neurologist if the tingly/numb feeling in her arm was related to her Lewy Body Dementia; after physically evaluating her he said he didn't think it was. I didn't think to mention it to the cardiologist triage nurse last week because I didn't know it was relevant, but mom's neighbor said it may be. I don't want to be paranoid, just wanting to take care of Mom. Thanks.     Pt sent [employer] message

## 2025-06-09 NOTE — TELEPHONE ENCOUNTER
I spoke with Tracy Leach's son, Abdoul (okay per SALVADOR) and gave them message from the provider.  They verbalized understanding & have no further questions at this time.    Sandra ROGERS , RN  Triage [facility]  06/09/25 13:36 EDT

## 2025-06-09 NOTE — TELEPHONE ENCOUNTER
She may have an extra metoprolol for diastolic blood pressure greater than 100 providing the systolic at the same time is at least greater than 130

## 2025-06-10 NOTE — TELEPHONE ENCOUNTER
I spoke with Tracy Leach's son, Abdoul (okay per SALVADOR) and gave them message from the provider.  They verbalized understanding & have no further questions at this time.    Sandra ROGERS , RN  Triage [facility]  6/9/2025 13:35 EDT

## 2025-06-12 ENCOUNTER — OFFICE VISIT (OUTPATIENT)
Dept: CARDIOLOGY | Age: 75
End: 2025-06-12
Payer: MEDICARE

## 2025-06-12 ENCOUNTER — TELEPHONE (OUTPATIENT)
Dept: CARDIOLOGY | Age: 75
End: 2025-06-12

## 2025-06-12 VITALS
SYSTOLIC BLOOD PRESSURE: 130 MMHG | HEART RATE: 68 BPM | WEIGHT: 236.8 LBS | DIASTOLIC BLOOD PRESSURE: 98 MMHG | BODY MASS INDEX: 38.06 KG/M2 | HEIGHT: 66 IN

## 2025-06-12 DIAGNOSIS — R01.1 HEART MURMUR: ICD-10-CM

## 2025-06-12 DIAGNOSIS — I10 ESSENTIAL HYPERTENSION: ICD-10-CM

## 2025-06-12 DIAGNOSIS — I48.19 PERSISTENT ATRIAL FIBRILLATION: Primary | ICD-10-CM

## 2025-06-12 PROCEDURE — 3075F SYST BP GE 130 - 139MM HG: CPT | Performed by: INTERNAL MEDICINE

## 2025-06-12 PROCEDURE — 1159F MED LIST DOCD IN RCRD: CPT | Performed by: INTERNAL MEDICINE

## 2025-06-12 PROCEDURE — 3080F DIAST BP >= 90 MM HG: CPT | Performed by: INTERNAL MEDICINE

## 2025-06-12 PROCEDURE — 1160F RVW MEDS BY RX/DR IN RCRD: CPT | Performed by: INTERNAL MEDICINE

## 2025-06-12 PROCEDURE — 99214 OFFICE O/P EST MOD 30 MIN: CPT | Performed by: INTERNAL MEDICINE

## 2025-06-12 RX ORDER — AMLODIPINE BESYLATE 5 MG/1
5 TABLET ORAL DAILY
Qty: 30 TABLET | Refills: 11 | Status: SHIPPED | OUTPATIENT
Start: 2025-06-12

## 2025-06-12 RX ORDER — FEXOFENADINE HCL 60 MG/1
60 TABLET, FILM COATED ORAL DAILY
COMMUNITY

## 2025-06-12 NOTE — TELEPHONE ENCOUNTER
Pts son called stating that he thought that you were going to call in a blood pressure medication in to pts pharmacy    When they got to there pharmacy there was nothing there to     Pharmacy is in correct in chart    Note not complete so not sure what medication you would like sent in     Amlodipine was called in.  ROX MCMAHON

## 2025-06-13 NOTE — PROGRESS NOTES
[facility]      Patient Name: Tracy Leach  :1950  Age: 74 y.o.  Encounter Provider:  Froy Dave Jr, MD      Chief Complaint: Follow-up persistent atrial fibrillation      HPI  Tracy Leach is a 74 y.o. female past medical history of Lewy body dementia and persistent atrial fibrillation who presents for routine follow-up.     Last clinic visit note: I originally saw the patient in the hospital where she was diagnosed with persistent atrial fibrillation.  She had follow-up in clinic with ASIF Mcguire who evaluated her prior to hip surgery.  Given her inactivity and questionable functional status a stress study was performed showing no evidence of ischemia.  Echocardiogram performed same time showed normal EF with mild to moderate aortic stenosis.  Patient took several days to recover from anesthesia and her neurology team is very concerned about any future surgical intervention requiring general anesthesia and is advising her against it unless absolutely necessary.  She seems well today in clinic.  She denies any chest pain or shortness of air.  No orthopnea, PND or edema.  She has no palpitations, dizziness or syncope.  She remains in atrial fibrillation.  There have been no falls.  No bleeding complications of anticoagulation.  No cardiac complaints at time of interview.    Blood pressure has been elevated lately.  At home blood pressure is been ranging 150 to 180 mmHg.  Her machine is quite accurate on check today compared to manual cuff pressure.  She denies angina.  Occasional palpitations with no dizziness or syncope.  No orthopnea, PND or edema.  Blood pressure today in clinic is 130/98 mmHg.  Murmur is noted on examination.    The following portions of the patient's history were reviewed and updated as appropriate: allergies, current medications, past family history, past medical history, past social history, past surgical history and problem  "list.      Review of Systems   Constitutional: Negative for chills and fever.   HENT:  Negative for hoarse voice and sore throat.    Eyes:  Negative for double vision and photophobia.   Cardiovascular:  Negative for chest pain, leg swelling, near-syncope, orthopnea, palpitations, paroxysmal nocturnal dyspnea and syncope.   Respiratory:  Negative for cough and wheezing.    Skin:  Negative for poor wound healing and rash.   Musculoskeletal:  Negative for arthritis and joint swelling.   Gastrointestinal:  Negative for bloating, abdominal pain, hematemesis and hematochezia.   Neurological:  Negative for dizziness and focal weakness.   Psychiatric/Behavioral:  Negative for depression and suicidal ideas.        OBJECTIVE:   Vital Signs  Vitals:    06/12/25 1003   BP: 130/98   Pulse: 68     Estimated body mass index is 38.22 kg/m² as calculated from the following:    Height as of this encounter: 167.6 cm (66\").    Weight as of this encounter: 107 kg (236 lb 12.8 oz).    Vitals reviewed.   Constitutional:       Appearance: Healthy appearance. Not in distress.   Neck:      Vascular: No JVR. JVD normal.   Pulmonary:      Effort: Pulmonary effort is normal.      Breath sounds: Normal breath sounds. No wheezing. No rhonchi. No rales.   Chest:      Chest wall: Not tender to palpatation.   Cardiovascular:      PMI at left midclavicular line. Normal rate. Irregularly irregular rhythm. Normal S1. Normal S2.       Murmurs: There is a systolic murmur.      No gallop.  No click. No rub.   Pulses:     Intact distal pulses.   Edema:     Peripheral edema absent.   Abdominal:      General: Bowel sounds are normal.      Palpations: Abdomen is soft.      Tenderness: There is no abdominal tenderness.   Musculoskeletal: Normal range of motion.         General: No tenderness. Skin:     General: Skin is warm and dry.   Neurological:      General: No focal deficit present.         Procedures         BUN   Date Value Ref Range Status   12/16/2023 " 18 8 - 23 mg/dL Final   11/07/2022 14 10 - 20 mg/dL Final     Creatinine   Date Value Ref Range Status   12/16/2023 0.59 0.57 - 1.00 mg/dL Final   11/07/2022 0.68 0.55 - 1.02 mg/dL Final     Potassium   Date Value Ref Range Status   12/16/2023 3.5 3.5 - 5.2 mmol/L Final   11/07/2022 4.3 3.5 - 5.1 mmol/L Final     ALT (SGPT)   Date Value Ref Range Status   12/10/2023 14 1 - 33 U/L Final   11/07/2022 13 0 - 55 U/L Final     AST (SGOT)   Date Value Ref Range Status   12/10/2023 18 1 - 32 U/L Final   11/07/2022 19 5 - 34 U/L Final           ASSESSMENT:     74-year-old female with Lewy body dementia and persistent atrial fibrillation presents for routine follow-up      PLAN OF CARE:     Persistent atrial fibrillation -currently rate controlled with no bleeding complications on Eliquis.  We had a long and azucena discussion about risk of bleeding including risk of falls.  At this time they agree to continue anticoagulation.  Carotid atherosclerosis -this is listed in her medical chart however on carotid duplex there was no clear evidence for carotid atherosclerosis.  We will continue to monitor.  Lewy body dementia  Essential hypertension -add amlodipine 5 mg.  Twice daily blood pressure log to be sent in after 1 week.  Murmur -check echocardiogram.  Abnormal EKG -anterior infarct is suggested on EKG.  Stress study in February 2024 showed no evidence of infarct or ischemia.    Return to clinic 6 months             Discharge Medications            Accurate as of June 12, 2025 11:59 PM. If you have any questions, ask your nurse or doctor.                New Medications        Instructions Start Date   amLODIPine 5 MG tablet  Commonly known as: NORVASC  Started by: Froy Dave   5 mg, Oral, Daily             Continue These Medications        Instructions Start Date   acetaminophen 325 MG tablet  Commonly known as: TYLENOL   650 mg, Oral, Every 4 Hours PRN      amoxicillin 500 MG capsule  Commonly known as: AMOXIL   TAKE 4  CAPSULES 1 HOUR PRIOR TO DENTAL APPOINTMENT.      Calcium Carb-Cholecalciferol 500-5 MG-MCG tablet per tablet   1 tablet, Daily      cetirizine 10 MG chewable tablet  Commonly known as: ZyrTEC   10 mg, Daily      clonazePAM 0.5 MG tablet  Commonly known as: KlonoPIN   0.5 mg, Nightly      CYCLOSPORINE IN KLARITY OP   1 drop, As Needed      donepezil 10 MG tablet  Commonly known as: ARICEPT   5 mg, 2 Times Daily      Eliquis 5 MG tablet tablet  Generic drug: apixaban   TAKE 1 TABLET BY MOUTH EVERY 12 HOURS FOR  ATRIAL  FIBRILLATION      fexofenadine 60 MG tablet  Commonly known as: ALLEGRA   60 mg, Daily      Lidocaine 4 %   2 patches, Transdermal, Every 24 Hours Scheduled, Remove & Discard patch within 12 hours or as directed by MD      loperamide 2 MG capsule  Commonly known as: IMODIUM   2 mg, 3 Times Daily      memantine 10 MG tablet  Commonly known as: NAMENDA   10 mg, 2 Times Daily      metoprolol tartrate 25 MG tablet  Commonly known as: LOPRESSOR   12.5 mg, Oral, Every 12 Hours      multivitamin tablet tablet   1 tablet, Daily      NON FORMULARY   Daily      NON FORMULARY   1 tablet, Daily      QUEtiapine 25 MG tablet  Commonly known as: SEROquel   12.5 mg, Nightly      rOPINIRole 1 MG tablet  Commonly known as: REQUIP   1 mg, Oral, Nightly, Take 1 hour before bedtime.               Thank you for allowing me to participate in the care of your patient,      Sincerely,   Froy Dave MD  [facility]  06/13/25  11:10 EDT

## 2025-07-01 ENCOUNTER — HOSPITAL ENCOUNTER (OUTPATIENT)
Dept: CARDIOLOGY | Facility: HOSPITAL | Age: 75
Discharge: HOME | End: 2025-07-01
Admitting: INTERNAL MEDICINE
Payer: MEDICARE

## 2025-07-01 VITALS
HEIGHT: 66 IN | OXYGEN SATURATION: 97 % | BODY MASS INDEX: 37.93 KG/M2 | WEIGHT: 236 LBS | SYSTOLIC BLOOD PRESSURE: 160 MMHG | HEART RATE: 80 BPM | DIASTOLIC BLOOD PRESSURE: 85 MMHG

## 2025-07-01 DIAGNOSIS — I48.19 PERSISTENT ATRIAL FIBRILLATION: ICD-10-CM

## 2025-07-01 LAB
AORTIC ARCH: 2.9 CM
AORTIC DIMENSIONLESS INDEX: 0.28 (DI)
ASCENDING AORTA: 3.2 CM
AV MEAN PRESS GRAD SYS DOP V1V2: 30 MMHG
AV VMAX SYS DOP: 362 CM/SEC
BH CV ECHO LEFT VENTRICLE GLOBAL LONGITUDINAL STRAIN: -20 %
BH CV ECHO MEAS - ACS: 1.56 CM
BH CV ECHO MEAS - AO MAX PG: 52.4 MMHG
BH CV ECHO MEAS - AO ROOT AREA (BSA CORRECTED): 1.6 CM2
BH CV ECHO MEAS - AO ROOT DIAM: 3.4 CM
BH CV ECHO MEAS - AO V2 VTI: 71.6 CM
BH CV ECHO MEAS - AVA(I,D): 0.8 CM2
BH CV ECHO MEAS - EDV(CUBED): 74.1 ML
BH CV ECHO MEAS - EDV(MOD-SP2): 68 ML
BH CV ECHO MEAS - EDV(MOD-SP4): 78 ML
BH CV ECHO MEAS - EF(MOD-SP2): 61.8 %
BH CV ECHO MEAS - EF(MOD-SP4): 69.2 %
BH CV ECHO MEAS - ESV(CUBED): 23.9 ML
BH CV ECHO MEAS - ESV(MOD-SP2): 26 ML
BH CV ECHO MEAS - ESV(MOD-SP4): 24 ML
BH CV ECHO MEAS - FS: 31.4 %
BH CV ECHO MEAS - IVS/LVPW: 0.85 CM
BH CV ECHO MEAS - IVSD: 1.1 CM
BH CV ECHO MEAS - LA 3D VOL INDEX: 53
BH CV ECHO MEAS - LAT PEAK E' VEL: 9.5 CM/SEC
BH CV ECHO MEAS - LV DIASTOLIC VOL/BSA (35-75): 36.3 CM2
BH CV ECHO MEAS - LV MASS(C)D: 178.2 GRAMS
BH CV ECHO MEAS - LV MAX PG: 3 MMHG
BH CV ECHO MEAS - LV MEAN PG: 2 MMHG
BH CV ECHO MEAS - LV SYSTOLIC VOL/BSA (12-30): 11.2 CM2
BH CV ECHO MEAS - LV V1 MAX: 87 CM/SEC
BH CV ECHO MEAS - LV V1 VTI: 19.7 CM
BH CV ECHO MEAS - LVIDD: 4.2 CM
BH CV ECHO MEAS - LVIDS: 2.9 CM
BH CV ECHO MEAS - LVOT AREA: 2.9 CM2
BH CV ECHO MEAS - LVOT DIAM: 1.92 CM
BH CV ECHO MEAS - LVPWD: 1.3 CM
BH CV ECHO MEAS - MED PEAK E' VEL: 9.8 CM/SEC
BH CV ECHO MEAS - MV DEC SLOPE: 682.1 CM/SEC2
BH CV ECHO MEAS - MV DEC TIME: 0.18 SEC
BH CV ECHO MEAS - MV E MAX VEL: 160.1 CM/SEC
BH CV ECHO MEAS - MV MAX PG: 12.7 MMHG
BH CV ECHO MEAS - MV MEAN PG: 5.6 MMHG
BH CV ECHO MEAS - MV P1/2T: 74.2 MSEC
BH CV ECHO MEAS - MV V2 VTI: 32.7 CM
BH CV ECHO MEAS - MVA(P1/2T): 3 CM2
BH CV ECHO MEAS - MVA(VTI): 1.75 CM2
BH CV ECHO MEAS - PA ACC TIME: 0.1 SEC
BH CV ECHO MEAS - PA V2 MAX: 85.6 CM/SEC
BH CV ECHO MEAS - PULM A REVS DUR: 0.1 SEC
BH CV ECHO MEAS - PULM A REVS VEL: 15.1 CM/SEC
BH CV ECHO MEAS - PULM DIAS VEL: 94.9 CM/SEC
BH CV ECHO MEAS - PULM S/D: 0.68
BH CV ECHO MEAS - PULM SYS VEL: 64.8 CM/SEC
BH CV ECHO MEAS - QP/QS: 1.24
BH CV ECHO MEAS - RAP SYSTOLE: 15 MMHG
BH CV ECHO MEAS - RV MAX PG: 2 MMHG
BH CV ECHO MEAS - RV V1 MAX: 70.7 CM/SEC
BH CV ECHO MEAS - RV V1 VTI: 13.7 CM
BH CV ECHO MEAS - RVOT DIAM: 2.6 CM
BH CV ECHO MEAS - RVSP: 54.2 MMHG
BH CV ECHO MEAS - SUP REN AO DIAM: 1.9 CM
BH CV ECHO MEAS - SV(LVOT): 57.3 ML
BH CV ECHO MEAS - SV(MOD-SP2): 42 ML
BH CV ECHO MEAS - SV(MOD-SP4): 54 ML
BH CV ECHO MEAS - SV(RVOT): 70.9 ML
BH CV ECHO MEAS - SVI(LVOT): 26.7 ML/M2
BH CV ECHO MEAS - SVI(MOD-SP2): 19.6 ML/M2
BH CV ECHO MEAS - SVI(MOD-SP4): 25.2 ML/M2
BH CV ECHO MEAS - TAPSE (>1.6): 1.75 CM
BH CV ECHO MEAS - TR MAX PG: 39.2 MMHG
BH CV ECHO MEAS - TR MAX VEL: 313.1 CM/SEC
BH CV ECHO MEASUREMENTS AVERAGE E/E' RATIO: 16.59
BH CV XLRA - RV BASE: 3.9 CM
BH CV XLRA - RV LENGTH: 7.6 CM
BH CV XLRA - RV MID: 3 CM
BH CV XLRA - TDI S': 9.1 CM/SEC
LEFT ATRIUM VOLUME INDEX: 57.7 ML/M2
LV EF 3D SEGMENTATION: 54 %
LV EF BIPLANE MOD: 65.2 %
SINUS: 3.3 CM
STJ: 2.9 CM

## 2025-07-01 PROCEDURE — 93356 MYOCRD STRAIN IMG SPCKL TRCK: CPT

## 2025-07-01 PROCEDURE — 93306 TTE W/DOPPLER COMPLETE: CPT

## 2025-07-02 ENCOUNTER — RESULTS FOLLOW-UP (OUTPATIENT)
Dept: CARDIOLOGY | Age: 75
End: 2025-07-02
Payer: MEDICARE

## 2025-07-02 NOTE — TELEPHONE ENCOUNTER
I spoke with Abdoul, patient's son/POA.  Discussed progressive aortic valve stenosis and elevated pulmonary pressure.  She has increased fatigue with activity.  We discussed structural heart team consultation and he is agreeable.      Nahed-can you please ensure this patient gets a consultation appointment for severe aortic valve stenosis

## 2025-08-04 ENCOUNTER — TELEPHONE (OUTPATIENT)
Dept: CARDIOLOGY | Facility: HOSPITAL | Age: 75
End: 2025-08-04
Payer: MEDICARE

## 2025-08-04 ENCOUNTER — OFFICE VISIT (OUTPATIENT)
Age: 75
End: 2025-08-04
Payer: MEDICARE

## 2025-08-04 VITALS
HEIGHT: 66 IN | HEART RATE: 74 BPM | BODY MASS INDEX: 37.96 KG/M2 | WEIGHT: 236.2 LBS | SYSTOLIC BLOOD PRESSURE: 130 MMHG | DIASTOLIC BLOOD PRESSURE: 80 MMHG

## 2025-08-04 DIAGNOSIS — I35.0 NONRHEUMATIC AORTIC VALVE STENOSIS: Primary | ICD-10-CM

## 2025-08-05 ENCOUNTER — TELEPHONE (OUTPATIENT)
Age: 75
End: 2025-08-05
Payer: MEDICARE

## 2025-08-05 LAB
BUN SERPL-MCNC: 13 MG/DL (ref 8–27)
BUN/CREAT SERPL: 15 (ref 12–28)
CALCIUM SERPL-MCNC: 9.7 MG/DL (ref 8.7–10.3)
CHLORIDE SERPL-SCNC: 102 MMOL/L (ref 96–106)
CO2 SERPL-SCNC: 26 MMOL/L (ref 20–29)
CREAT SERPL-MCNC: 0.86 MG/DL (ref 0.57–1)
EGFRCR SERPLBLD CKD-EPI 2021: 71 ML/MIN/1.73
ERYTHROCYTE [DISTWIDTH] IN BLOOD BY AUTOMATED COUNT: 13 % (ref 11.7–15.4)
GLUCOSE SERPL-MCNC: 87 MG/DL (ref 70–99)
HCT VFR BLD AUTO: 43.6 % (ref 34–46.6)
HGB BLD-MCNC: 14.1 G/DL (ref 11.1–15.9)
MCH RBC QN AUTO: 31.1 PG (ref 26.6–33)
MCHC RBC AUTO-ENTMCNC: 32.3 G/DL (ref 31.5–35.7)
MCV RBC AUTO: 96 FL (ref 79–97)
PLATELET # BLD AUTO: 340 X10E3/UL (ref 150–450)
POTASSIUM SERPL-SCNC: 4.5 MMOL/L (ref 3.5–5.2)
RBC # BLD AUTO: 4.53 X10E6/UL (ref 3.77–5.28)
SODIUM SERPL-SCNC: 142 MMOL/L (ref 134–144)
WBC # BLD AUTO: 7.1 X10E3/UL (ref 3.4–10.8)

## 2025-08-08 ENCOUNTER — HOSPITAL ENCOUNTER (OUTPATIENT)
Facility: HOSPITAL | Age: 75
Setting detail: HOSPITAL OUTPATIENT SURGERY
Discharge: HOME | End: 2025-08-08
Attending: INTERNAL MEDICINE | Admitting: INTERNAL MEDICINE
Payer: MEDICARE

## 2025-08-08 VITALS
DIASTOLIC BLOOD PRESSURE: 69 MMHG | OXYGEN SATURATION: 98 % | HEART RATE: 65 BPM | RESPIRATION RATE: 16 BRPM | TEMPERATURE: 96.1 F | SYSTOLIC BLOOD PRESSURE: 114 MMHG

## 2025-08-08 DIAGNOSIS — I35.0 NONRHEUMATIC AORTIC VALVE STENOSIS: Primary | ICD-10-CM

## 2025-08-08 DIAGNOSIS — R01.1 HEART MURMUR: ICD-10-CM

## 2025-08-08 DIAGNOSIS — I35.0 NONRHEUMATIC AORTIC VALVE STENOSIS: ICD-10-CM

## 2025-08-08 PROCEDURE — 82810 BLOOD GASES O2 SAT ONLY: CPT

## 2025-08-08 PROCEDURE — 25810000003 SODIUM CHLORIDE 0.9 % SOLUTION: Performed by: INTERNAL MEDICINE

## 2025-08-08 PROCEDURE — 25010000002 MIDAZOLAM PER 1 MG: Performed by: INTERNAL MEDICINE

## 2025-08-08 PROCEDURE — C1894 INTRO/SHEATH, NON-LASER: HCPCS | Performed by: INTERNAL MEDICINE

## 2025-08-08 PROCEDURE — 93460 R&L HRT ART/VENTRICLE ANGIO: CPT | Performed by: INTERNAL MEDICINE

## 2025-08-08 PROCEDURE — C1769 GUIDE WIRE: HCPCS | Performed by: INTERNAL MEDICINE

## 2025-08-08 PROCEDURE — 25010000002 FENTANYL CITRATE (PF) 50 MCG/ML SOLUTION: Performed by: INTERNAL MEDICINE

## 2025-08-08 PROCEDURE — 25010000002 LIDOCAINE 2% SOLUTION: Performed by: INTERNAL MEDICINE

## 2025-08-08 PROCEDURE — 25010000002 HEPARIN (PORCINE) PER 1000 UNITS: Performed by: INTERNAL MEDICINE

## 2025-08-08 PROCEDURE — 82803 BLOOD GASES ANY COMBINATION: CPT

## 2025-08-08 PROCEDURE — 25510000001 IOPAMIDOL PER 1 ML: Performed by: INTERNAL MEDICINE

## 2025-08-08 PROCEDURE — C1887 CATHETER, GUIDING: HCPCS | Performed by: INTERNAL MEDICINE

## 2025-08-08 PROCEDURE — 85014 HEMATOCRIT: CPT

## 2025-08-08 PROCEDURE — 85018 HEMOGLOBIN: CPT

## 2025-08-08 RX ORDER — HEPARIN SODIUM 1000 [USP'U]/ML
INJECTION, SOLUTION INTRAVENOUS; SUBCUTANEOUS
Status: DISCONTINUED | OUTPATIENT
Start: 2025-08-08 | End: 2025-08-08 | Stop reason: HOSPADM

## 2025-08-08 RX ORDER — SODIUM CHLORIDE 9 MG/ML
75 INJECTION, SOLUTION INTRAVENOUS CONTINUOUS
Status: DISCONTINUED | OUTPATIENT
Start: 2025-08-08 | End: 2025-08-08 | Stop reason: HOSPADM

## 2025-08-08 RX ORDER — LIDOCAINE HYDROCHLORIDE 20 MG/ML
INJECTION, SOLUTION INFILTRATION; PERINEURAL
Status: DISCONTINUED | OUTPATIENT
Start: 2025-08-08 | End: 2025-08-08 | Stop reason: HOSPADM

## 2025-08-08 RX ORDER — SODIUM CHLORIDE 0.9 % (FLUSH) 0.9 %
10 SYRINGE (ML) INJECTION AS NEEDED
Status: DISCONTINUED | OUTPATIENT
Start: 2025-08-08 | End: 2025-08-08 | Stop reason: HOSPADM

## 2025-08-08 RX ORDER — SODIUM CHLORIDE 0.9 % (FLUSH) 0.9 %
3 SYRINGE (ML) INJECTION EVERY 12 HOURS SCHEDULED
Status: DISCONTINUED | OUTPATIENT
Start: 2025-08-08 | End: 2025-08-08 | Stop reason: HOSPADM

## 2025-08-08 RX ORDER — FENTANYL CITRATE 50 UG/ML
INJECTION, SOLUTION INTRAMUSCULAR; INTRAVENOUS
Status: DISCONTINUED | OUTPATIENT
Start: 2025-08-08 | End: 2025-08-08 | Stop reason: HOSPADM

## 2025-08-08 RX ORDER — FUROSEMIDE 40 MG/1
20 TABLET ORAL DAILY
Status: DISCONTINUED | OUTPATIENT
Start: 2025-08-08 | End: 2025-08-08 | Stop reason: HOSPADM

## 2025-08-08 RX ORDER — FUROSEMIDE 20 MG/1
20 TABLET ORAL DAILY
Qty: 90 TABLET | Refills: 3 | Status: SHIPPED | OUTPATIENT
Start: 2025-08-08

## 2025-08-08 RX ORDER — IOPAMIDOL 755 MG/ML
INJECTION, SOLUTION INTRAVASCULAR
Status: DISCONTINUED | OUTPATIENT
Start: 2025-08-08 | End: 2025-08-08 | Stop reason: HOSPADM

## 2025-08-08 RX ORDER — MIDAZOLAM HYDROCHLORIDE 1 MG/ML
INJECTION, SOLUTION INTRAMUSCULAR; INTRAVENOUS
Status: DISCONTINUED | OUTPATIENT
Start: 2025-08-08 | End: 2025-08-08 | Stop reason: HOSPADM

## 2025-08-08 RX ORDER — ACETAMINOPHEN 325 MG/1
650 TABLET ORAL EVERY 4 HOURS PRN
Status: DISCONTINUED | OUTPATIENT
Start: 2025-08-08 | End: 2025-08-08 | Stop reason: HOSPADM

## 2025-08-08 RX ORDER — VERAPAMIL HYDROCHLORIDE 2.5 MG/ML
INJECTION INTRAVENOUS
Status: DISCONTINUED | OUTPATIENT
Start: 2025-08-08 | End: 2025-08-08 | Stop reason: HOSPADM

## 2025-08-08 RX ORDER — LORATADINE 10 MG/1
10 TABLET ORAL DAILY
COMMUNITY

## 2025-08-08 RX ORDER — SODIUM CHLORIDE 9 MG/ML
40 INJECTION, SOLUTION INTRAVENOUS AS NEEDED
Status: DISCONTINUED | OUTPATIENT
Start: 2025-08-08 | End: 2025-08-08 | Stop reason: HOSPADM

## 2025-08-08 RX ADMIN — SODIUM CHLORIDE 75 ML/HR: 9 INJECTION, SOLUTION INTRAVENOUS at 08:47

## 2025-08-08 RX ADMIN — FUROSEMIDE 20 MG: 40 TABLET ORAL at 10:56

## 2025-08-11 ENCOUNTER — OFFICE VISIT (OUTPATIENT)
Dept: CARDIAC SURGERY | Facility: CLINIC | Age: 75
End: 2025-08-11
Payer: MEDICARE

## 2025-08-11 VITALS
DIASTOLIC BLOOD PRESSURE: 90 MMHG | HEART RATE: 80 BPM | SYSTOLIC BLOOD PRESSURE: 133 MMHG | OXYGEN SATURATION: 96 % | WEIGHT: 235 LBS | BODY MASS INDEX: 37.77 KG/M2 | HEIGHT: 66 IN | TEMPERATURE: 96.6 F | RESPIRATION RATE: 18 BRPM

## 2025-08-11 DIAGNOSIS — F02.A0 MILD LEWY BODY DEMENTIA, UNSPECIFIED WHETHER BEHAVIORAL, PSYCHOTIC, OR MOOD DISTURBANCE OR ANXIETY: ICD-10-CM

## 2025-08-11 DIAGNOSIS — G31.83 MILD LEWY BODY DEMENTIA, UNSPECIFIED WHETHER BEHAVIORAL, PSYCHOTIC, OR MOOD DISTURBANCE OR ANXIETY: ICD-10-CM

## 2025-08-11 DIAGNOSIS — I35.0 NONRHEUMATIC AORTIC VALVE STENOSIS: ICD-10-CM

## 2025-08-11 DIAGNOSIS — I10 PRIMARY HYPERTENSION: ICD-10-CM

## 2025-08-11 DIAGNOSIS — I48.0 PAROXYSMAL ATRIAL FIBRILLATION: Primary | ICD-10-CM

## 2025-08-11 PROCEDURE — 3080F DIAST BP >= 90 MM HG: CPT | Performed by: THORACIC SURGERY (CARDIOTHORACIC VASCULAR SURGERY)

## 2025-08-11 PROCEDURE — 99204 OFFICE O/P NEW MOD 45 MIN: CPT | Performed by: THORACIC SURGERY (CARDIOTHORACIC VASCULAR SURGERY)

## 2025-08-11 PROCEDURE — 3075F SYST BP GE 130 - 139MM HG: CPT | Performed by: THORACIC SURGERY (CARDIOTHORACIC VASCULAR SURGERY)

## 2025-08-11 PROCEDURE — 1159F MED LIST DOCD IN RCRD: CPT | Performed by: THORACIC SURGERY (CARDIOTHORACIC VASCULAR SURGERY)

## 2025-08-11 PROCEDURE — 1160F RVW MEDS BY RX/DR IN RCRD: CPT | Performed by: THORACIC SURGERY (CARDIOTHORACIC VASCULAR SURGERY)

## 2025-08-13 LAB
HCO3 BLDA-SCNC: 25.9 MMOL/L (ref 21–28)
HCO3 BLDA-SCNC: 27.3 MMOL/L (ref 21–28)
HCT VFR BLDA CALC: 37 % (ref 38–51)
HCT VFR BLDA CALC: 39 % (ref 38–51)
HGB BLDA-MCNC: 12.6 G/DL (ref 12–17)
HGB BLDA-MCNC: 13.3 G/DL (ref 12–17)
PCO2 BLDA: 42.2 MM HG (ref 35–45)
PCO2 BLDV: 47.1 MMHG (ref 41–51)
PH BLDA: 7.37 [PH] (ref 7.31–7.41)
PH BLDA: 7.4 PH UNITS (ref 7.35–7.45)
PO2 BLDA: 89 MMHG (ref 80–105)
PO2 BLDV: 44 MM HG (ref 35–42)
POTASSIUM BLDA-SCNC: 3.4 MMOL/L (ref 3.5–4.9)
POTASSIUM BLDA-SCNC: 3.5 MMOL/L (ref 3.5–4.9)
SAO2 % BLDA: 97 % (ref 95–98)
SAO2 % BLDCOA: 78 % (ref 45–75)

## 2025-08-14 ENCOUNTER — HOSPITAL ENCOUNTER (OUTPATIENT)
Dept: CT IMAGING | Facility: HOSPITAL | Age: 75
Discharge: HOME | End: 2025-08-14
Admitting: INTERNAL MEDICINE
Payer: MEDICARE

## 2025-08-14 VITALS — HEART RATE: 86 BPM

## 2025-08-14 DIAGNOSIS — R01.1 HEART MURMUR: ICD-10-CM

## 2025-08-14 DIAGNOSIS — I35.0 NONRHEUMATIC AORTIC VALVE STENOSIS: ICD-10-CM

## 2025-08-14 LAB — CREAT BLDA-MCNC: 0.8 MG/DL (ref 0.6–1.3)

## 2025-08-14 PROCEDURE — 25510000001 IOPAMIDOL PER 1 ML: Performed by: INTERNAL MEDICINE

## 2025-08-14 PROCEDURE — 82565 ASSAY OF CREATININE: CPT

## 2025-08-14 PROCEDURE — 71275 CT ANGIOGRAPHY CHEST: CPT

## 2025-08-14 PROCEDURE — 74174 CTA ABD&PLVS W/CONTRAST: CPT

## 2025-08-14 RX ORDER — IOPAMIDOL 755 MG/ML
50 INJECTION, SOLUTION INTRAVASCULAR
Status: COMPLETED | OUTPATIENT
Start: 2025-08-14 | End: 2025-08-14

## 2025-08-14 RX ORDER — IOPAMIDOL 755 MG/ML
100 INJECTION, SOLUTION INTRAVASCULAR
Status: COMPLETED | OUTPATIENT
Start: 2025-08-14 | End: 2025-08-14

## 2025-08-14 RX ADMIN — IOPAMIDOL 95 ML: 755 INJECTION, SOLUTION INTRAVENOUS at 11:18

## 2025-08-14 RX ADMIN — IOPAMIDOL 50 ML: 755 INJECTION, SOLUTION INTRAVENOUS at 11:19

## 2025-08-15 DIAGNOSIS — I35.0 NONRHEUMATIC AORTIC VALVE STENOSIS: Primary | ICD-10-CM

## 2025-08-18 ENCOUNTER — PREP FOR SURGERY (OUTPATIENT)
Dept: OTHER | Facility: HOSPITAL | Age: 75
End: 2025-08-18
Payer: MEDICARE

## 2025-08-18 ENCOUNTER — TELEPHONE (OUTPATIENT)
Dept: CARDIOLOGY | Facility: HOSPITAL | Age: 75
End: 2025-08-18
Payer: MEDICARE

## 2025-08-18 DIAGNOSIS — I35.0 AORTIC VALVE STENOSIS, ETIOLOGY OF CARDIAC VALVE DISEASE UNSPECIFIED: Primary | ICD-10-CM

## 2025-08-18 DIAGNOSIS — I11.0 HYPERTENSIVE HEART DISEASE WITH HEART FAILURE: ICD-10-CM

## 2025-08-18 DIAGNOSIS — R79.9 ABNORMAL FINDING OF BLOOD CHEMISTRY, UNSPECIFIED: ICD-10-CM

## 2025-08-18 DIAGNOSIS — R79.1 ABNORMAL COAGULATION PROFILE: ICD-10-CM

## 2025-08-18 RX ORDER — CHLORHEXIDINE GLUCONATE ORAL RINSE 1.2 MG/ML
15 SOLUTION DENTAL ONCE
OUTPATIENT
Start: 2025-08-18 | End: 2025-08-18

## 2025-08-18 RX ORDER — CHLORHEXIDINE GLUCONATE ORAL RINSE 1.2 MG/ML
15 SOLUTION DENTAL EVERY 12 HOURS
OUTPATIENT
Start: 2025-08-18 | End: 2025-08-19

## (undated) DEVICE — GLIDESHEATH BASIC HYDROPHILIC COATED INTRODUCER SHEATH: Brand: GLIDESHEATH

## (undated) DEVICE — FEMORAL ENTRY ANGIOGRAPHY SHIELD-YELLOW: Brand: RADPAD

## (undated) DEVICE — GW INQWIRE FC PTFE STR .035IN 150

## (undated) DEVICE — DGW .035 FC J3MM 260CM TEF: Brand: EMERALD

## (undated) DEVICE — TR BAND RADIAL ARTERY COMPRESSION DEVICE: Brand: TR BAND

## (undated) DEVICE — BALN PRESS WEDGE 5F 110CM

## (undated) DEVICE — KT MANIFLD CARDIAC

## (undated) DEVICE — LOU PACE DEFIB: Brand: MEDLINE INDUSTRIES, INC.

## (undated) DEVICE — EACH LANGSTON DUAL LUMEN CATHETER IS INDICATED FOR DELIVERY OF CONTRAST MEDIUM IN ANGIOGRAPHIC STUDIES AND FOR SIMULTANEOUS PRESSURE MEASUREMENT FROM TWO SITES. THIS TYPE OF PRESSURE MEASUREMENT IS USEFUL IN DETERMINING TRANSVALVULAR, INTRAVASCULAR AND INTRAVENTRICULAR PRESSURE GRADIENTS.: Brand: LANGSTON® DUAL LUMEN CATHETER

## (undated) DEVICE — GLIDESHEATH SLENDER STAINLESS STEEL KIT: Brand: GLIDESHEATH SLENDER

## (undated) DEVICE — PK CATH CARD 40

## (undated) DEVICE — RADIFOCUS OPTITORQUE ANGIOGRAPHIC CATHETER: Brand: OPTITORQUE

## (undated) DEVICE — CATH DIAG IMPULSE AL1 5F 100CM

## (undated) DEVICE — RADIFOCUS GLIDEWIRE: Brand: GLIDEWIRE